# Patient Record
Sex: MALE | Race: WHITE | NOT HISPANIC OR LATINO | Employment: FULL TIME | URBAN - METROPOLITAN AREA
[De-identification: names, ages, dates, MRNs, and addresses within clinical notes are randomized per-mention and may not be internally consistent; named-entity substitution may affect disease eponyms.]

---

## 2017-08-22 ENCOUNTER — OFFICE VISIT (OUTPATIENT)
Dept: CARDIOLOGY | Facility: MEDICAL CENTER | Age: 62
End: 2017-08-22

## 2017-08-22 VITALS
SYSTOLIC BLOOD PRESSURE: 138 MMHG | HEIGHT: 69 IN | HEART RATE: 87 BPM | WEIGHT: 199 LBS | OXYGEN SATURATION: 96 % | DIASTOLIC BLOOD PRESSURE: 82 MMHG | BODY MASS INDEX: 29.47 KG/M2

## 2017-08-22 DIAGNOSIS — I42.8 NON-ISCHEMIC CARDIOMYOPATHY (HCC): ICD-10-CM

## 2017-08-22 DIAGNOSIS — I50.20 ACC/AHA STAGE C SYSTOLIC HEART FAILURE (HCC): ICD-10-CM

## 2017-08-22 DIAGNOSIS — I50.9 HEART FAILURE, NYHA CLASS 1 (HCC): ICD-10-CM

## 2017-08-22 DIAGNOSIS — I10 ESSENTIAL HYPERTENSION: ICD-10-CM

## 2017-08-22 PROCEDURE — 94620 PR PULMONARY STRESS TESTING,SIMPLE: CPT | Performed by: INTERNAL MEDICINE

## 2017-08-22 PROCEDURE — 99214 OFFICE O/P EST MOD 30 MIN: CPT | Performed by: INTERNAL MEDICINE

## 2017-08-22 RX ORDER — SPIRONOLACTONE 25 MG/1
25 TABLET ORAL DAILY
Qty: 30 TAB | Refills: 3 | Status: SHIPPED | OUTPATIENT
Start: 2017-08-22 | End: 2017-12-03 | Stop reason: SDUPTHER

## 2017-08-22 RX ORDER — CARVEDILOL 25 MG/1
25 TABLET ORAL 2 TIMES DAILY WITH MEALS
Qty: 180 TAB | Refills: 3 | Status: SHIPPED | OUTPATIENT
Start: 2017-08-22 | End: 2018-02-06 | Stop reason: SDUPTHER

## 2017-08-22 RX ORDER — FUROSEMIDE 20 MG/1
20 TABLET ORAL 2 TIMES DAILY
Qty: 180 TAB | Refills: 3 | Status: SHIPPED | OUTPATIENT
Start: 2017-08-22 | End: 2018-02-06 | Stop reason: SDUPTHER

## 2017-08-22 RX ORDER — LISINOPRIL 40 MG/1
40 TABLET ORAL DAILY
Qty: 90 TAB | Refills: 3 | Status: SHIPPED | OUTPATIENT
Start: 2017-08-22 | End: 2018-02-06 | Stop reason: SDUPTHER

## 2017-08-22 RX ORDER — POTASSIUM CHLORIDE 750 MG/1
10 CAPSULE, EXTENDED RELEASE ORAL 2 TIMES DAILY
Qty: 60 CAP | Refills: 11 | Status: SHIPPED | OUTPATIENT
Start: 2017-08-22 | End: 2018-02-06

## 2017-08-22 ASSESSMENT — MINNESOTA LIVING WITH HEART FAILURE QUESTIONNAIRE (MLHF)
FEELING LIKE A BURDEN TO FAMILY AND FRIENDS: 1
WORKING AROUND THE HOUSE OR YARD DIFFICULT: 0
LOSS OF SELF CONTROL IN YOUR LIFE: 1
TIRED, FATIGUED OR LOW ON ENERGY: 1
DIFFICULTY TO CONCENTRATE OR REMEMBERING THINGS: 1
MAKING YOU SHORT OF BREATH: 1
DIFFICULTY GOING AWAY FROM HOME: 1
TOTAL_SCORE: 15
WALKING ABOUT OR CLIMBING STAIRS DIFFICULT: 1
DIFFICULTY WITH SEXUAL ACTIVITIES: 0
DIFFICULTY WORKING TO EARN A LIVING: 1
SWELLING IN ANKLES OR LEGS: 0
DIFFICULTY SOCIALIZING WITH FAMILY OR FRIENDS: 0
COSTING YOU MONEY FOR MEDICAL CARE: 0
MAKING YOU STAY IN A HOSPITAL: 0
MAKING YOU FEEL DEPRESSED: 2
MAKING YOU WORRY: 2
HAVING TO SIT OR LIE DOWN DURING THE DAY: 1
GIVING YOU SIDE EFFECTS FROM TREATMENTS: 0
DIFFICULTY SLEEPING WELL AT NIGHT: 0
DIFFICULTY WITH RECREATIONAL PASTIMES, SPORTS, HOBBIES: 1
EATING LESS FOODS YOU LIKE: 1

## 2017-08-22 ASSESSMENT — ENCOUNTER SYMPTOMS
SPEECH CHANGE: 0
CLAUDICATION: 0
VOMITING: 0
BLURRED VISION: 0
BRUISES/BLEEDS EASILY: 0
SENSORY CHANGE: 0
DEPRESSION: 0
EYE PAIN: 0
PALPITATIONS: 0
MYALGIAS: 0
FALLS: 0
ABDOMINAL PAIN: 0
DIZZINESS: 0
EYE DISCHARGE: 0
COUGH: 0
WEIGHT LOSS: 0
LOSS OF CONSCIOUSNESS: 0
PND: 0
NAUSEA: 0
SHORTNESS OF BREATH: 0
FEVER: 0
BLOOD IN STOOL: 0
HEADACHES: 0
ORTHOPNEA: 0
CHILLS: 0
DOUBLE VISION: 0
HALLUCINATIONS: 0

## 2017-08-22 ASSESSMENT — 6 MINUTE WALK TEST (6MWT): TOTAL DISTANCE WALKED (METERS): 414.5

## 2017-08-22 ASSESSMENT — NEW YORK HEART ASSOCIATION (NYHA) CLASSIFICATION: NYHA FUNCTIONAL CLASS: CLASS L

## 2017-08-22 NOTE — MR AVS SNAPSHOT
"        Doug Summersho   2017 4:30 PM   Office Visit   MRN: [id number]    Department:  [facility]   Dept Phone:  [phone]    Description:  Male : 1955   Provider:  Neil King M.D.           Reason for Visit     Follow-Up needs medication refill      Allergies as of 2017     No Known Allergies      You were diagnosed with     ACC/AHA stage C systolic heart failure (CMS-HCC)   [9182503]       Heart failure, NYHA class 1 (CMS-HCC)   [339145]       Non-ischemic cardiomyopathy (CMS-HCC)   [039895]       Essential hypertension   [8502509]       Alcoholic cardiomyopathy (CMS-HCC)   [425.5.ICD-9-CM]         Vital Signs     Blood Pressure Pulse Height Weight Body Mass Index Oxygen Saturation    138/82 mmHg 87 1.753 m (5' 9\") 90.266 kg (199 lb) 29.37 kg/m2 96%    Smoking Status                   Current Every Day Smoker           Basic Information     Date Of Birth Sex Race Ethnicity Preferred Language    1955 Male White Non- English      Your appointments     2017  4:30 PM   Heart Failure Established with Neil King M.D.   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      Below and/or attached are the medications your provider expects you to take. Review all of your home medications and newly ordered medications with your provider and/or pharmacist. Follow medication instructions as directed by your provider and/or pharmacist. Please keep your medication list with you and share with your provider. Update the information when medications are discontinued, doses are changed, or new medications (including over-the-counter products) are added; and carry medication information at all times in the event of emergency situations     Allergies:  No Known Allergies          Medications  Valid as of: August 22, 2017 -  5:21 PM    Generic Name Brand Name Tablet Size Instructions for use    Aspirin (Tablet Delayed Response) aspirin 81 MG Take 1 Tab by mouth every day.        Carvedilol (Tab) COREG 25 MG Take 1 Tab by mouth 2 times a day, with meals.        Furosemide (Tab) LASIX 20 MG Take 1 Tab by mouth 2 times a day.        Lisinopril (Tab) PRINIVIL, ZESTRIL 40 MG Take 1 Tab by mouth every day.        Multiple Vitamin (Tab) THERAGRAN  Take 1 Tab by mouth every day.        Potassium Chloride (Cap CR) MICRO-K 10 MEQ Take 1 Cap by mouth 2 times a day.        Spironolactone (Tab) ALDACTONE 25 MG Take 1 Tab by mouth every day.        .                 Medicines prescribed today were sent to:     [id number] - [place], [place] - [address]    [address] [place] [address]    Phone: [phone] Fax: [phone]    Open 24 Hours?: No      Medication refill instructions:       If your prescription bottle indicates you have medication refills left, it is not necessary to call your provider’s office. Please contact your pharmacy and they will refill your medication.    If your prescription bottle indicates you do not have any refills left, you may request refills at any time through one of the following ways: The online  Possibility Space system (except Urgent Care), by calling your provider’s office, or by asking your pharmacy to contact your provider’s office with a refill request. Medication refills are processed only during regular business hours and may not be available until the next business day. Your provider may request additional information or to have a follow-up visit with you prior to refilling your medication.   *Please Note: Medication refills are assigned a new Rx number when refilled electronically. Your pharmacy may indicate that no refills were authorized even though a new prescription for the same medication is available at the pharmacy. Please request the medicine by name with the pharmacy before contacting your provider for a refill.        Your To Do List     Future Labs/Procedures Complete By Expires    BASIC METABOLIC PANEL  As directed 8/22/2018    COMP METABOLIC PANEL  As directed 8/23/2018    ECHOCARDIOGRAM LTD W/O CONT  As directed 8/22/2018         Possibility Space Access Code: [email or web address]  Expires: 9/21/2017  5:21 PM    Possibility Space  A secure, online tool to manage your health information     [employer]’s Possibility Space® is a secure, online tool that connects you to your personalized health information from the privacy of your home -- day or night - making it very easy for you to manage your healthcare. Once the activation process is completed, you can even access your medical information using the Possibility Space chavo, which is available for free in the Apple Chavo store or Google Play store.     Possibility Space provides the following levels of access (as shown below):   My Chart Features   [facility] Doctor [facility]  Specialists [facility]  Urgent  Care Non-[facility]  Primary Care  Doctor   Email your healthcare team securely and privately 24/7 X X X    Manage appointments: schedule your next appointment; view details of past/upcoming appointments X      Request prescription refills. X      View recent personal medical records, including  lab and immunizations X X X X   View health record, including health history, allergies, medications X X X X   Read reports about your outpatient visits, procedures, consult and ER notes X X X X   See your discharge summary, which is a recap of your hospital and/or ER visit that includes your diagnosis, lab results, and care plan. X X       How to register for [employer]:  1. Go to  [email or web address].  2. Click on the Sign Up Now box, which takes you to the New Member Sign Up page. You will need to provide the following information:  a. Enter your [employer] Access Code exactly as it appears at the top of this page. (You will not need to use this code after you’ve completed the sign-up process. If you do not sign up before the expiration date, you must request a new code.)   b. Enter your date of birth.   c. Enter your home email address.   d. Click Submit, and follow the next screen’s instructions.  3. Create a [employer] ID. This will be your [employer] login ID and cannot be changed, so think of one that is secure and easy to remember.  4. Create a [employer] password. You can change your password at any time.  5. Enter your Password Reset Question and Answer. This can be used at a later time if you forget your password.   6. Enter your e-mail address. This allows you to receive e-mail notifications when new information is available in [employer].  7. Click Sign Up. You can now view your health information.    For assistance activating your [employer] account, call [phone]        Quit Tobacco Information     Do you want to quit using tobacco?    Quitting tobacco decreases risks of cancer, heart and lung disease, increases life expectancy, improves sense of taste and smell, and increases spending money, among other benefits.    If you are thinking about quitting, we can help.  • [employer] Quit Tobacco Program: [phone]  o Program occurs weekly for four weeks and includes pharmacist consultation on products to support  quitting smoking or chewing tobacco. A provider referral is needed for pharmacist consultation.  • Tobacco Users Help Hotline: 1-760-QUIT-NOW (995-4348) or https://[place].quitlogix.org/  o Free, confidential telephone and online coaching for [place] residents. Sessions are designed on a schedule that is convenient for you. Eligible clients receive free nicotine replacement therapy.  • Nationally: www.smokefree.gov  o Information and professional assistance to support both immediate and long-term needs as you become, and remain, a non-smoker. Smokefree.gov allows you to choose the help that best fits your needs.

## 2017-08-23 NOTE — PROGRESS NOTES
Subjective:   Doug Arora is a 62 y.o. male who presents today for cardiac care for nonischemic cardiomyopathy with left ventricular systolic function documented at 20%. He did have a history of alcohol abuse along with methamphetamine abuse. He was in the hospital in August 2016 because of heart failure exacerbation. Since then, patient has stopped using methamphetamine and alcohol. He feels extremely well these days. He is currently taken carvedilol 25 mg by mouth twice a day, lisinopril 40 by mouth once a day and furosemide 20 mg by mouth bid.    Patient was able to complete 415 m during his 6 minute walk test. his O2 saturation at baseline was 96% and at the end of the test, the O2 saturation was 95%. he reported 0.5 level of dyspnea on Jan scale.        Past Medical History   Diagnosis Date   • HTN (hypertension) 9/7/2010   • Hep C w/o coma, chronic (CMS-HCC)      Past Surgical History   Procedure Laterality Date   • Tonsillectomy     • Hernia repair       Family History   Problem Relation Age of Onset   • Stroke Mother    • Psychiatry Father    • Hypertension Father      History   Smoking status   • Current Every Day Smoker -- 1.50 packs/day for 24 years   • Types: Cigarettes   Smokeless tobacco   • Never Used     No Known Allergies  Outpatient Encounter Prescriptions as of 8/22/2017   Medication Sig Dispense Refill   • spironolactone (ALDACTONE) 25 MG Tab Take 1 Tab by mouth every day. 30 Tab 3   • lisinopril (PRINIVIL, ZESTRIL) 40 MG tablet Take 1 Tab by mouth every day. 90 Tab 3   • potassium chloride (MICRO-K) 10 MEQ capsule Take 1 Cap by mouth 2 times a day. 60 Cap 11   • furosemide (LASIX) 20 MG Tab Take 1 Tab by mouth 2 times a day. 180 Tab 3   • carvedilol (COREG) 25 MG Tab Take 1 Tab by mouth 2 times a day, with meals. 180 Tab 3   • aspirin EC 81 MG EC tablet Take 1 Tab by mouth every day. 30 Tab 0   • [DISCONTINUED] potassium chloride (MICRO-K) 10 MEQ capsule Take 10 mEq by mouth 2 times a day.    "  • [DISCONTINUED] lisinopril (PRINIVIL, ZESTRIL) 40 MG tablet Take 1 Tab by mouth every day. 30 Tab 11   • [DISCONTINUED] carvedilol (COREG) 25 MG Tab Take 1 Tab by mouth 2 times a day, with meals. 60 Tab 11   • [DISCONTINUED] furosemide (LASIX) 20 MG Tab Take 1 Tab by mouth 2 times a day. 60 Tab 11   • multivitamin (THERAGRAN) Tab Take 1 Tab by mouth every day.       No facility-administered encounter medications on file as of 8/22/2017.     Review of Systems   Constitutional: Negative for fever, chills, weight loss and malaise/fatigue.   HENT: Negative for ear discharge, ear pain, hearing loss and nosebleeds.    Eyes: Negative for blurred vision, double vision, pain and discharge.   Respiratory: Negative for cough and shortness of breath.    Cardiovascular: Negative for chest pain, palpitations, orthopnea, claudication, leg swelling and PND.   Gastrointestinal: Negative for nausea, vomiting, abdominal pain, blood in stool and melena.   Genitourinary: Negative for dysuria and hematuria.   Musculoskeletal: Negative for myalgias, joint pain and falls.   Skin: Negative for itching and rash.   Neurological: Negative for dizziness, sensory change, speech change, loss of consciousness and headaches.   Endo/Heme/Allergies: Negative for environmental allergies. Does not bruise/bleed easily.   Psychiatric/Behavioral: Negative for depression, suicidal ideas and hallucinations.        Objective:   /82 mmHg  Pulse 87  Ht 1.753 m (5' 9\")  Wt 90.266 kg (199 lb)  BMI 29.37 kg/m2  SpO2 96%    Physical Exam   Constitutional: He is oriented to person, place, and time. He appears well-developed and well-nourished.   HENT:   Head: Normocephalic and atraumatic.   Eyes: EOM are normal.   Neck: Normal range of motion. No JVD present.   Cardiovascular: Normal rate, regular rhythm, normal heart sounds and intact distal pulses.  Exam reveals no gallop and no friction rub.    No murmur heard.  Bilateral femoral pulses are 2+, " bilateral dorsalis pedis pulses are 2+, bilateral posterior tibialis pulses are 2+.   Pulmonary/Chest: No respiratory distress. He has no wheezes. He has no rales. He exhibits no tenderness.   Abdominal: Soft. Bowel sounds are normal. There is no tenderness. There is no rebound and no guarding.   The is no presence of abdominal bruits   Musculoskeletal: Normal range of motion.   Neurological: He is alert and oriented to person, place, and time.   Skin: Skin is warm and dry.   Psychiatric: He has a normal mood and affect.   Nursing note and vitals reviewed.      Assessment:     1. ACC/AHA stage C systolic heart failure (CMS-[facility])  spironolactone (ALDACTONE) 25 MG Tab    BASIC METABOLIC PANEL    COMP METABOLIC PANEL    LIPID PANEL    ECHOCARDIOGRAM LTD W/O CONT    potassium chloride (MICRO-K) 10 MEQ capsule   2. Heart failure, NYHA class 1 (CMS-[facility])  spironolactone (ALDACTONE) 25 MG Tab    BASIC METABOLIC PANEL    COMP METABOLIC PANEL    LIPID PANEL    ECHOCARDIOGRAM LTD W/O CONT    potassium chloride (MICRO-K) 10 MEQ capsule   3. Non-ischemic cardiomyopathy (CMS-[facility])  spironolactone (ALDACTONE) 25 MG Tab    BASIC METABOLIC PANEL    COMP METABOLIC PANEL    LIPID PANEL    ECHOCARDIOGRAM LTD W/O CONT    potassium chloride (MICRO-K) 10 MEQ capsule   4. Essential hypertension  spironolactone (ALDACTONE) 25 MG Tab    BASIC METABOLIC PANEL    COMP METABOLIC PANEL    LIPID PANEL    ECHOCARDIOGRAM LTD W/O CONT    lisinopril (PRINIVIL, ZESTRIL) 40 MG tablet    potassium chloride (MICRO-K) 10 MEQ capsule    furosemide (LASIX) 20 MG Tab   5. Alcoholic cardiomyopathy (CMS-HCC)  lisinopril (PRINIVIL, ZESTRIL) 40 MG tablet    potassium chloride (MICRO-K) 10 MEQ capsule    furosemide (LASIX) 20 MG Tab    carvedilol (COREG) 25 MG Tab       Medical Decision Making:  Today's Assessment / Status / Plan:     Today, based on physical examination findings, patient is euvolemic. No JVD, lungs are clear to auscultation, no pitting edema in  bilateral lower extremities, no ascites.    We will add spironolactone 25 mg by mouth once a day.  Continue carvedilol at 25 mg by mouth twice a day, lisinopril 40 mg by mouth once a day and furosemide 20 mg by mouth twice a day.    We will get blood tests in 2 weeks.    Patient does not have insurance and therefore cannot afford repeated transthoracic echocardiogram right now. I think that is okay for us to wait until he can get insurance.    No indication for ICD as patient is NYHA class I.

## 2017-08-25 NOTE — PROGRESS NOTES
"Reevaluation of 6MWT/MLWHF Questionnaire    Date: 2016  6MWT: refused-too SOB from walking from car.  MLWHF: 61    Date: 2017  6MWT: 414.5 meters  MLWHF: 15    OP Heart Failure  Vitals  Appointment Type: Heart Failure Established  Weight: 90.266 kg (199 lb)  Height: 175.3 cm (5' 9\")  BMI (Calculated): 29.39  Blood Pressure: 138/82 mmHg  Pulse: 87    System Assessment  NYHA Functional Class Assessment: Class l  ACC/AHA HF Stage: C    Smoking Hx  Have you Ever Smoked: Yes  Have you Smoked in the Last 12 Mos: Yes  Have you Quit Smoking: No   Smoking Cessation Offered: Patient Counseled    Alcohol Hx  Do you Drink?: No     Illicit Drug Hx  Illicit Drug History: Yes    [place] Living with Heart Failure  Swelling in Ankles or Legs: 0  Having to Sit or Lie Down During the Day: 1  Walking About or Climbing Stairs Difficult: 1  Working Around the House or Yard Difficult: 0  Difficulty Going Away from Home: 1  Difficulty Sleeping Well at Night: 0  Difficulty Socializing with Family or Friends: 0  Difficulty Working to Earn a Livin  Difficulty with Recreational Pastimes, Sports, Hobbies: 1  Difficulty with Sexual Activities: 0  Eating Less Foods You Like: 1  Making you Short of Breath: 1  Tired, Fatigued or Low on Energy: 1  Making you Stay in a Hospital: 0  Costing you Money for Medical Care?: 0  Giving you Side Effects from Treatments: 0  Feeling like a [place] to Family and Friends: 1  Loss of Self Control in your Life: 1  Making You Worry: 2  Difficulty to Concentrate or Remembering Things: 1  Making you Feel Depressed: 2  MLF Total Score : 15    6 Minute Walk Test  Baseline to end of test: 6:00  Total meters walked: 414.5       JEREMI Charles RN  [phone]  "

## 2017-09-28 ENCOUNTER — TELEPHONE (OUTPATIENT)
Dept: CARDIOLOGY | Facility: MEDICAL CENTER | Age: 62
End: 2017-09-28

## 2017-09-28 NOTE — TELEPHONE ENCOUNTER
Pt calling to clarify directions and dosage on medication   Received: Today   Message Contents   Saeed Correa R.N.   Phone Number: [phone]             TT/Fifi     Pt is calling to clarify dosage/direction's for potassium chloride (MICRO-K) 10 MEQ. Per pt he only remembers having to take medication once daily. He can be reached at [phone].      Returned patient call. Potassium dose clarified with patient. Pt discussed occ intermittent lightheadedness. Discussed hydration, after patient states being in the sun. Pt states he doesn't come close to 2L intake per day. Pt encouraged to do so. Pt states he is opening another bottle of water right now and will keep a more careful eye on his intake and will call back if no changes.

## 2017-10-13 DIAGNOSIS — E78.49 OTHER HYPERLIPIDEMIA: ICD-10-CM

## 2017-10-13 RX ORDER — ATORVASTATIN CALCIUM 20 MG/1
20 TABLET, FILM COATED ORAL DAILY
Qty: 90 TAB | Refills: 3 | OUTPATIENT
Start: 2017-10-13 | End: 2018-02-06 | Stop reason: SDUPTHER

## 2017-12-03 DIAGNOSIS — I50.20 ACC/AHA STAGE C SYSTOLIC HEART FAILURE (HCC): ICD-10-CM

## 2017-12-03 DIAGNOSIS — I50.9 HEART FAILURE, NYHA CLASS 1 (HCC): ICD-10-CM

## 2017-12-03 DIAGNOSIS — I42.8 NON-ISCHEMIC CARDIOMYOPATHY (HCC): ICD-10-CM

## 2017-12-03 DIAGNOSIS — I10 ESSENTIAL HYPERTENSION: ICD-10-CM

## 2017-12-05 RX ORDER — SPIRONOLACTONE 25 MG/1
TABLET ORAL
Qty: 90 TAB | Refills: 3 | Status: SHIPPED | OUTPATIENT
Start: 2017-12-05 | End: 2018-02-06 | Stop reason: SDUPTHER

## 2018-02-02 ENCOUNTER — TELEPHONE (OUTPATIENT)
Dept: CARDIOLOGY | Facility: MEDICAL CENTER | Age: 63
End: 2018-02-02

## 2018-02-06 ENCOUNTER — OFFICE VISIT (OUTPATIENT)
Dept: CARDIOLOGY | Facility: MEDICAL CENTER | Age: 63
End: 2018-02-06

## 2018-02-06 VITALS
BODY MASS INDEX: 29.92 KG/M2 | WEIGHT: 202 LBS | DIASTOLIC BLOOD PRESSURE: 62 MMHG | SYSTOLIC BLOOD PRESSURE: 114 MMHG | HEART RATE: 66 BPM | HEIGHT: 69 IN | OXYGEN SATURATION: 97 %

## 2018-02-06 DIAGNOSIS — E78.49 OTHER HYPERLIPIDEMIA: ICD-10-CM

## 2018-02-06 DIAGNOSIS — I42.8 NON-ISCHEMIC CARDIOMYOPATHY (HCC): ICD-10-CM

## 2018-02-06 DIAGNOSIS — I50.9 HEART FAILURE, NYHA CLASS 1 (HCC): ICD-10-CM

## 2018-02-06 DIAGNOSIS — I50.20 ACC/AHA STAGE C SYSTOLIC HEART FAILURE (HCC): ICD-10-CM

## 2018-02-06 DIAGNOSIS — I10 ESSENTIAL HYPERTENSION: ICD-10-CM

## 2018-02-06 PROCEDURE — 99214 OFFICE O/P EST MOD 30 MIN: CPT | Performed by: INTERNAL MEDICINE

## 2018-02-06 RX ORDER — CARVEDILOL 25 MG/1
25 TABLET ORAL 2 TIMES DAILY WITH MEALS
Qty: 180 TAB | Refills: 3 | Status: SHIPPED | OUTPATIENT
Start: 2018-02-06 | End: 2019-01-25 | Stop reason: SDUPTHER

## 2018-02-06 RX ORDER — ATORVASTATIN CALCIUM 20 MG/1
20 TABLET, FILM COATED ORAL DAILY
Qty: 90 TAB | Refills: 3 | Status: SHIPPED | OUTPATIENT
Start: 2018-02-06 | End: 2019-01-25 | Stop reason: SDUPTHER

## 2018-02-06 RX ORDER — FUROSEMIDE 20 MG/1
20 TABLET ORAL 2 TIMES DAILY
Qty: 180 TAB | Refills: 3 | Status: SHIPPED | OUTPATIENT
Start: 2018-02-06 | End: 2019-01-25 | Stop reason: SDUPTHER

## 2018-02-06 RX ORDER — SPIRONOLACTONE 25 MG/1
TABLET ORAL
Qty: 90 TAB | Refills: 3 | Status: SHIPPED | OUTPATIENT
Start: 2018-02-06 | End: 2019-01-25 | Stop reason: SDUPTHER

## 2018-02-06 RX ORDER — LISINOPRIL 40 MG/1
40 TABLET ORAL DAILY
Qty: 90 TAB | Refills: 3 | Status: SHIPPED | OUTPATIENT
Start: 2018-02-06 | End: 2019-01-25 | Stop reason: SDUPTHER

## 2018-02-06 ASSESSMENT — ENCOUNTER SYMPTOMS
CLAUDICATION: 0
WEIGHT LOSS: 0
SHORTNESS OF BREATH: 0
HALLUCINATIONS: 0
SPEECH CHANGE: 0
EYE DISCHARGE: 0
DIZZINESS: 0
HEADACHES: 0
PND: 0
FALLS: 0
PALPITATIONS: 0
CHILLS: 0
SENSORY CHANGE: 0
EYE PAIN: 0
BLOOD IN STOOL: 0
COUGH: 0
ABDOMINAL PAIN: 0
ORTHOPNEA: 0
FEVER: 0
BRUISES/BLEEDS EASILY: 0
LOSS OF CONSCIOUSNESS: 0
VOMITING: 0
NAUSEA: 0
DOUBLE VISION: 0
BLURRED VISION: 0
DEPRESSION: 0
MYALGIAS: 0

## 2018-02-07 NOTE — PROGRESS NOTES
Subjective:   Doug Arora is a 62 y.o. male who presents today for cardiac care for nonischemic cardiomyopathy with left ventricular systolic function documented at 20%. He did have a history of alcohol abuse along with methamphetamine abuse. He was in the hospital in August 2016 because of heart failure exacerbation. Since then, patient has stopped using methamphetamine and alcohol. He feels extremely well these days. He is currently taken carvedilol 25 mg by mouth twice a day, lisinopril 40 by mouth once a day and furosemide 20 mg by mouth bid.     At prior visit, patient was able to complete 415 m during his 6 minute walk test. his O2 saturation at baseline was 96% and at the end of the test, the O2 saturation was 95%. he reported 0.5 level of dyspnea on Jan scale.    Chief Complaint: dyspnea.    Past Medical History:   Diagnosis Date   • Hep C w/o coma, chronic (CMS-HCC)    • HTN (hypertension) 9/7/2010     Past Surgical History:   Procedure Laterality Date   • HERNIA REPAIR     • TONSILLECTOMY       Family History   Problem Relation Age of Onset   • Stroke Mother    • Psychiatry Father    • Hypertension Father      History   Smoking Status   • Current Every Day Smoker   • Packs/day: 1.50   • Years: 24.00   • Types: Cigarettes   Smokeless Tobacco   • Never Used     No Known Allergies  Outpatient Encounter Prescriptions as of 2/6/2018   Medication Sig Dispense Refill   • carvedilol (COREG) 25 MG Tab Take 1 Tab by mouth 2 times a day, with meals. 180 Tab 3   • furosemide (LASIX) 20 MG Tab Take 1 Tab by mouth 2 times a day. 180 Tab 3   • atorvastatin (LIPITOR) 20 MG Tab Take 1 Tab by mouth every day. 90 Tab 3   • lisinopril (PRINIVIL, ZESTRIL) 40 MG tablet Take 1 Tab by mouth every day. 90 Tab 3   • spironolactone (ALDACTONE) 25 MG Tab TAKE ONE TABLET BY MOUTH ONE TIME DAILY 90 Tab 3   • multivitamin (THERAGRAN) Tab Take 1 Tab by mouth every day.     • aspirin EC 81 MG EC tablet Take 1 Tab by mouth every day. 30  "Tab 0   • [DISCONTINUED] spironolactone (ALDACTONE) 25 MG Tab TAKE ONE TABLET BY MOUTH ONE TIME DAILY 90 Tab 3   • [DISCONTINUED] atorvastatin (LIPITOR) 20 MG Tab Take 1 Tab by mouth every day. 90 Tab 3   • [DISCONTINUED] lisinopril (PRINIVIL, ZESTRIL) 40 MG tablet Take 1 Tab by mouth every day. 90 Tab 3   • [DISCONTINUED] potassium chloride (MICRO-K) 10 MEQ capsule Take 1 Cap by mouth 2 times a day. 60 Cap 11   • [DISCONTINUED] furosemide (LASIX) 20 MG Tab Take 1 Tab by mouth 2 times a day. 180 Tab 3   • [DISCONTINUED] carvedilol (COREG) 25 MG Tab Take 1 Tab by mouth 2 times a day, with meals. 180 Tab 3     No facility-administered encounter medications on file as of 2/6/2018.      Review of Systems   Constitutional: Negative for chills, fever, malaise/fatigue and weight loss.   HENT: Negative for ear discharge, ear pain, hearing loss and nosebleeds.    Eyes: Negative for blurred vision, double vision, pain and discharge.   Respiratory: Negative for cough and shortness of breath.    Cardiovascular: Negative for chest pain, palpitations, orthopnea, claudication, leg swelling and PND.   Gastrointestinal: Negative for abdominal pain, blood in stool, melena, nausea and vomiting.   Genitourinary: Negative for dysuria and hematuria.   Musculoskeletal: Negative for falls, joint pain and myalgias.   Skin: Negative for itching and rash.   Neurological: Negative for dizziness, sensory change, speech change, loss of consciousness and headaches.   Endo/Heme/Allergies: Negative for environmental allergies. Does not bruise/bleed easily.   Psychiatric/Behavioral: Negative for depression, hallucinations and suicidal ideas.        Objective:   /62   Pulse 66   Ht 1.753 m (5' 9\")   Wt 91.6 kg (202 lb)   SpO2 97%   BMI 29.83 kg/m²     Physical Exam   Constitutional: He is oriented to person, place, and time. No distress.   HENT:   Head: Normocephalic and atraumatic.   Eyes: EOM are normal.   Neck: Normal range of motion. " No JVD present.   Cardiovascular: Normal rate, regular rhythm, normal heart sounds and intact distal pulses.  Exam reveals no gallop and no friction rub.    No murmur heard.  Bilateral femoral pulses are 2+, bilateral dorsalis pedis pulses are 2+, bilateral posterior tibialis pulses are 2+.   Pulmonary/Chest: No respiratory distress. He has no wheezes. He has no rales. He exhibits no tenderness.   Abdominal: Soft. Bowel sounds are normal. There is no tenderness. There is no rebound and no guarding.   The is no presence of abdominal bruits   Musculoskeletal: Normal range of motion.   Neurological: He is alert and oriented to person, place, and time.   Skin: Skin is warm and dry.   Psychiatric: He has a normal mood and affect.   Nursing note and vitals reviewed.      Assessment:     1. ACC/AHA stage C systolic heart failure (CMS-HCC)  carvedilol (COREG) 25 MG Tab    spironolactone (ALDACTONE) 25 MG Tab    BASIC METABOLIC PANEL   2. Heart failure, NYHA class 1 (CMS-[facility])  carvedilol (COREG) 25 MG Tab    spironolactone (ALDACTONE) 25 MG Tab    BASIC METABOLIC PANEL   3. Non-ischemic cardiomyopathy (CMS-HCC)  spironolactone (ALDACTONE) 25 MG Tab   4. Essential hypertension  furosemide (LASIX) 20 MG Tab    lisinopril (PRINIVIL, ZESTRIL) 40 MG tablet    spironolactone (ALDACTONE) 25 MG Tab    BASIC METABOLIC PANEL   5. Other hyperlipidemia  atorvastatin (LIPITOR) 20 MG Tab       Medical Decision Making:  Today's Assessment / Status / Plan:   Today, based on physical examination findings, patient is euvolemic. No JVD, lungs are clear to auscultation, no pitting edema in bilateral lower extremities, no ascites.    Dry weight is 202 lbs.    Continue spironolactone 25 mg by mouth once a day.  Continue carvedilol at 25 mg by mouth twice a day, lisinopril 40 mg by mouth once a day and furosemide 20 mg by mouth twice a day.     Patient does not have insurance and therefore cannot afford repeated transthoracic echocardiogram right  now. I think that is okay for us to wait until he can get insurance.     No indication for ICD as patient is NYHA class I.    I will see patient back in clinic with lab tests and studies results in 6 months.

## 2018-12-17 ENCOUNTER — TELEPHONE (OUTPATIENT)
Dept: CARDIOLOGY | Facility: MEDICAL CENTER | Age: 63
End: 2018-12-17

## 2018-12-17 NOTE — TELEPHONE ENCOUNTER
"S/w patient to see if the he had completed his labs but the patient stated that he needs to re-schedule due to that he now has medical insurance and it takes effect in Jan of next year. I asked the patient if he needs to be transferred to scheduling and the patient stated \"Yes, please.\"     I transferred patient to ext [phone] to re-schedule appt.   "

## 2019-01-25 ENCOUNTER — OFFICE VISIT (OUTPATIENT)
Dept: CARDIOLOGY | Facility: MEDICAL CENTER | Age: 64
End: 2019-01-25
Payer: COMMERCIAL

## 2019-01-25 VITALS
OXYGEN SATURATION: 95 % | HEIGHT: 69 IN | WEIGHT: 195 LBS | SYSTOLIC BLOOD PRESSURE: 92 MMHG | DIASTOLIC BLOOD PRESSURE: 50 MMHG | BODY MASS INDEX: 28.88 KG/M2 | HEART RATE: 82 BPM

## 2019-01-25 DIAGNOSIS — I51.89 LEFT VENTRICULAR SYSTOLIC DYSFUNCTION, NYHA CLASS 2: ICD-10-CM

## 2019-01-25 DIAGNOSIS — Z79.899 HIGH RISK MEDICATION USE: ICD-10-CM

## 2019-01-25 DIAGNOSIS — I10 HTN (HYPERTENSION), MALIGNANT: ICD-10-CM

## 2019-01-25 DIAGNOSIS — I42.8 NON-ISCHEMIC CARDIOMYOPATHY (HCC): ICD-10-CM

## 2019-01-25 DIAGNOSIS — I50.20 ACC/AHA STAGE C SYSTOLIC HEART FAILURE (HCC): ICD-10-CM

## 2019-01-25 DIAGNOSIS — E78.2 MIXED HYPERLIPIDEMIA: ICD-10-CM

## 2019-01-25 PROCEDURE — 99215 OFFICE O/P EST HI 40 MIN: CPT | Mod: 25 | Performed by: INTERNAL MEDICINE

## 2019-01-25 PROCEDURE — 94618 PULMONARY STRESS TESTING: CPT | Performed by: INTERNAL MEDICINE

## 2019-01-25 RX ORDER — CARVEDILOL 25 MG/1
25 TABLET ORAL 2 TIMES DAILY WITH MEALS
Qty: 180 TAB | Refills: 3 | Status: SHIPPED | OUTPATIENT
Start: 2019-01-25 | End: 2019-05-28 | Stop reason: SDUPTHER

## 2019-01-25 RX ORDER — ATORVASTATIN CALCIUM 20 MG/1
20 TABLET, FILM COATED ORAL DAILY
Qty: 90 TAB | Refills: 3 | Status: SHIPPED | OUTPATIENT
Start: 2019-01-25 | End: 2019-05-28 | Stop reason: SDUPTHER

## 2019-01-25 RX ORDER — SPIRONOLACTONE 25 MG/1
TABLET ORAL
Qty: 90 TAB | Refills: 3 | Status: SHIPPED | OUTPATIENT
Start: 2019-01-25 | End: 2019-05-28 | Stop reason: SDUPTHER

## 2019-01-25 RX ORDER — LISINOPRIL 40 MG/1
40 TABLET ORAL DAILY
Qty: 90 TAB | Refills: 3 | Status: SHIPPED | OUTPATIENT
Start: 2019-01-25 | End: 2019-05-28 | Stop reason: SDUPTHER

## 2019-01-25 RX ORDER — FUROSEMIDE 20 MG/1
20 TABLET ORAL 2 TIMES DAILY
Qty: 180 TAB | Refills: 3 | Status: SHIPPED | OUTPATIENT
Start: 2019-01-25 | End: 2019-05-28 | Stop reason: SDUPTHER

## 2019-01-25 ASSESSMENT — ENCOUNTER SYMPTOMS
NAUSEA: 0
SPEECH CHANGE: 0
COUGH: 0
PALPITATIONS: 0
ABDOMINAL PAIN: 0
MYALGIAS: 0
HEADACHES: 0
SHORTNESS OF BREATH: 1
FALLS: 0
VOMITING: 0
FEVER: 0
DEPRESSION: 0
DIAPHORESIS: 0
LOSS OF CONSCIOUSNESS: 0
DIZZINESS: 0
BRUISES/BLEEDS EASILY: 0
EYE PAIN: 0
HALLUCINATIONS: 0
MEMORY LOSS: 0
CLAUDICATION: 0
BLURRED VISION: 0
PND: 0
SENSORY CHANGE: 0
BLOOD IN STOOL: 0
CHILLS: 0
ORTHOPNEA: 0
DOUBLE VISION: 0
WEIGHT LOSS: 0
EYE DISCHARGE: 0

## 2019-01-25 ASSESSMENT — MINNESOTA LIVING WITH HEART FAILURE QUESTIONNAIRE (MLHF)
MAKING YOU WORRY: 1
COSTING YOU MONEY FOR MEDICAL CARE: 1
WALKING ABOUT OR CLIMBING STAIRS DIFFICULT: 0
WORKING AROUND THE HOUSE OR YARD DIFFICULT: 1
MAKING YOU FEEL DEPRESSED: 1
MAKING YOU STAY IN A HOSPITAL: 0
TIRED, FATIGUED OR LOW ON ENERGY: 1
MAKING YOU SHORT OF BREATH: 1
DIFFICULTY SLEEPING WELL AT NIGHT: 0
DIFFICULTY WITH RECREATIONAL PASTIMES, SPORTS, HOBBIES: 1
SWELLING IN ANKLES OR LEGS: 0
HAVING TO SIT OR LIE DOWN DURING THE DAY: 0
EATING LESS FOODS YOU LIKE: 1
DIFFICULTY SOCIALIZING WITH FAMILY OR FRIENDS: 0
LOSS OF SELF CONTROL IN YOUR LIFE: 1
TOTAL_SCORE: 11
DIFFICULTY GOING AWAY FROM HOME: 0
DIFFICULTY WITH SEXUAL ACTIVITIES: 0
DIFFICULTY WORKING TO EARN A LIVING: 0
DIFFICULTY TO CONCENTRATE OR REMEMBERING THINGS: 1
FEELING LIKE A BURDEN TO FAMILY AND FRIENDS: 1
GIVING YOU SIDE EFFECTS FROM TREATMENTS: 0

## 2019-01-25 ASSESSMENT — 6 MINUTE WALK TEST (6MWT): TOTAL DISTANCE WALKED (METERS): 475

## 2019-01-25 NOTE — PROGRESS NOTES
Chief Complaint   Patient presents with   • CHF (Systolic)     F/V: 1 YR       Subjective:   Doug Arora is a 63 y.o. male who presents today for cardiac care for nonischemic cardiomyopathy with left ventricular systolic function documented at 20%. He did have a history of alcohol abuse along with methamphetamine abuse. He was in the hospital in August 2016 because of heart failure exacerbation. Since then, patient has stopped using methamphetamine and alcohol. He feels extremely well these days. He is currently taken carvedilol 25 mg by mouth twice a day, lisinopril 40 by mouth once a day and furosemide 20 mg by mouth bid.     At prior visit, patient was able to complete 415 m during his 6 minute walk test. his O2 saturation at baseline was 96% and at the end of the test, the O2 saturation was 95%. he reported 0.5 level of dyspnea on Jan scale.    01/25/2019 Patient was able to complete 475 m during his 6 minute walk test. his O2 saturation at baseline was 95% and at the end of the test, the O2 saturation was 96%. he reported 1 level of dyspnea on Jan scale.    Patient is feeling better these days. Does get winded upon walking up inclines or for distance. No symptoms at rest or with daily living activities.      Past Medical History:   Diagnosis Date   • Hep C w/o coma, chronic (HCC)    • HTN (hypertension) 9/7/2010     Past Surgical History:   Procedure Laterality Date   • HERNIA REPAIR     • TONSILLECTOMY       Family History   Problem Relation Age of Onset   • Stroke Mother    • Psychiatry Father    • Hypertension Father      Social History     Social History   • Marital status:      Spouse name: N/A   • Number of children: N/A   • Years of education: N/A     Occupational History   • Not on file.     Social History Main Topics   • Smoking status: Former Smoker     Packs/day: 1.50     Years: 24.00     Types: Cigarettes     Quit date: 12/1/2018   • Smokeless tobacco: Never Used   • Alcohol use Yes       "Comment: \"very rarely\"   • Drug use: Yes     Types: Methamphetamines   • Sexual activity: Yes     Partners: Female     Other Topics Concern   • Not on file     Social History Narrative   • No narrative on file     No Known Allergies  Outpatient Encounter Prescriptions as of 1/25/2019   Medication Sig Dispense Refill   • atorvastatin (LIPITOR) 20 MG Tab Take 1 Tab by mouth every day. 90 Tab 3   • carvedilol (COREG) 25 MG Tab Take 1 Tab by mouth 2 times a day, with meals. 180 Tab 3   • lisinopril (PRINIVIL, ZESTRIL) 40 MG tablet Take 1 Tab by mouth every day. 90 Tab 3   • spironolactone (ALDACTONE) 25 MG Tab TAKE ONE TABLET BY MOUTH ONE TIME DAILY 90 Tab 3   • furosemide (LASIX) 20 MG Tab Take 1 Tab by mouth 2 times a day. 180 Tab 3   • aspirin EC 81 MG EC tablet Take 1 Tab by mouth every day. 30 Tab 0   • [DISCONTINUED] carvedilol (COREG) 25 MG Tab Take 1 Tab by mouth 2 times a day, with meals. 180 Tab 3   • [DISCONTINUED] furosemide (LASIX) 20 MG Tab Take 1 Tab by mouth 2 times a day. 180 Tab 3   • [DISCONTINUED] atorvastatin (LIPITOR) 20 MG Tab Take 1 Tab by mouth every day. 90 Tab 3   • [DISCONTINUED] lisinopril (PRINIVIL, ZESTRIL) 40 MG tablet Take 1 Tab by mouth every day. 90 Tab 3   • [DISCONTINUED] spironolactone (ALDACTONE) 25 MG Tab TAKE ONE TABLET BY MOUTH ONE TIME DAILY 90 Tab 3   • multivitamin (THERAGRAN) Tab Take 1 Tab by mouth every day.       No facility-administered encounter medications on file as of 1/25/2019.      Review of Systems   Constitutional: Negative for chills, diaphoresis, fever, malaise/fatigue and weight loss.   HENT: Negative for ear discharge, ear pain, hearing loss and nosebleeds.    Eyes: Negative for blurred vision, double vision, pain and discharge.   Respiratory: Positive for shortness of breath. Negative for cough.    Cardiovascular: Negative for chest pain, palpitations, orthopnea, claudication, leg swelling and PND.   Gastrointestinal: Negative for abdominal pain, blood in " "stool, melena, nausea and vomiting.   Genitourinary: Negative for dysuria, frequency and hematuria.   Musculoskeletal: Negative for falls, joint pain and myalgias.   Skin: Negative for itching and rash.   Neurological: Negative for dizziness, sensory change, speech change, loss of consciousness and headaches.   Endo/Heme/Allergies: Negative for environmental allergies. Does not bruise/bleed easily.   Psychiatric/Behavioral: Negative for depression, hallucinations, memory loss and suicidal ideas.        Objective:   BP (!) 92/50 (BP Location: Left arm, Patient Position: Sitting, BP Cuff Size: Adult)   Pulse 82   Ht 1.753 m (5' 9\")   Wt 88.5 kg (195 lb)   SpO2 95%   BMI 28.80 kg/m²     Physical Exam   Constitutional: He is oriented to person, place, and time. No distress.   HENT:   Head: Normocephalic and atraumatic.   Right Ear: External ear normal.   Left Ear: External ear normal.   Eyes: Right eye exhibits no discharge. Left eye exhibits no discharge.   Neck: No JVD present. No thyromegaly present.   Cardiovascular: Normal rate, regular rhythm, normal heart sounds and intact distal pulses.  Exam reveals no gallop and no friction rub.    No murmur heard.  Pulmonary/Chest: Breath sounds normal. No respiratory distress.   Abdominal: Bowel sounds are normal. He exhibits no distension. There is no tenderness.   Musculoskeletal: He exhibits no edema or tenderness.   Neurological: He is alert and oriented to person, place, and time. No cranial nerve deficit.   Skin: Skin is warm and dry. He is not diaphoretic.   Psychiatric: He has a normal mood and affect. His behavior is normal.   Nursing note and vitals reviewed.      Assessment:     1. ACC/AHA stage C systolic heart failure (HCC)  carvedilol (COREG) 25 MG Tab    lisinopril (PRINIVIL, ZESTRIL) 40 MG tablet    spironolactone (ALDACTONE) 25 MG Tab    furosemide (LASIX) 20 MG Tab    COMP METABOLIC PANEL    LIPID PANEL    EC-ECHOCARDIOGRAM COMPLETE W/O CONT   2. " Non-ischemic cardiomyopathy (HCC)  carvedilol (COREG) 25 MG Tab    lisinopril (PRINIVIL, ZESTRIL) 40 MG tablet    spironolactone (ALDACTONE) 25 MG Tab    furosemide (LASIX) 20 MG Tab    COMP METABOLIC PANEL    LIPID PANEL    EC-ECHOCARDIOGRAM COMPLETE W/O CONT   3. HTN (hypertension), malignant  COMP METABOLIC PANEL    LIPID PANEL    EC-ECHOCARDIOGRAM COMPLETE W/O CONT   4. Mixed hyperlipidemia  atorvastatin (LIPITOR) 20 MG Tab    COMP METABOLIC PANEL    LIPID PANEL    EC-ECHOCARDIOGRAM COMPLETE W/O CONT   5. High risk medication use  COMP METABOLIC PANEL    LIPID PANEL    EC-ECHOCARDIOGRAM COMPLETE W/O CONT   6. Left ventricular systolic dysfunction, NYHA class 2  COMP METABOLIC PANEL    LIPID PANEL    EC-ECHOCARDIOGRAM COMPLETE W/O CONT       Medical Decision Making:  Today's Assessment / Status / Plan:   Today, based on physical examination findings, patient is euvolemic. No JVD, lungs are clear to auscultation, no pitting edema in bilateral lower extremities, no ascites.     Dry weight is 195 lbs.     Continue spironolactone 25 mg by mouth once a day.  Continue carvedilol at 25 mg by mouth twice a day, lisinopril 40 mg by mouth once a day and furosemide 20 mg by mouth twice a day.     Will re-evaluate with TTE to see LVEF now that patient is somewhat symptomatic. If LVEF is 35% or less, will meet indication for ICD.    Patient is reluctant to get the insurance done due to money issue.     I will see patient back in clinic with lab tests and studies results in 6 months.

## 2019-05-17 ENCOUNTER — HOSPITAL ENCOUNTER (OUTPATIENT)
Dept: CARDIOLOGY | Facility: MEDICAL CENTER | Age: 64
End: 2019-05-17
Attending: INTERNAL MEDICINE
Payer: COMMERCIAL

## 2019-05-17 DIAGNOSIS — I42.8 NON-ISCHEMIC CARDIOMYOPATHY (HCC): ICD-10-CM

## 2019-05-17 DIAGNOSIS — I51.89 LEFT VENTRICULAR SYSTOLIC DYSFUNCTION, NYHA CLASS 2: ICD-10-CM

## 2019-05-17 DIAGNOSIS — I50.20 ACC/AHA STAGE C SYSTOLIC HEART FAILURE (HCC): ICD-10-CM

## 2019-05-17 DIAGNOSIS — E78.2 MIXED HYPERLIPIDEMIA: ICD-10-CM

## 2019-05-17 DIAGNOSIS — I10 HTN (HYPERTENSION), MALIGNANT: ICD-10-CM

## 2019-05-17 DIAGNOSIS — Z79.899 HIGH RISK MEDICATION USE: ICD-10-CM

## 2019-05-17 PROCEDURE — 93306 TTE W/DOPPLER COMPLETE: CPT

## 2019-05-17 PROCEDURE — 93306 TTE W/DOPPLER COMPLETE: CPT | Mod: 26 | Performed by: INTERNAL MEDICINE

## 2019-05-20 LAB
LV EJECT FRACT  99904: 50
LV EJECT FRACT MOD 2C 99903: 80.4
LV EJECT FRACT MOD 4C 99902: 57.18
LV EJECT FRACT MOD BP 99901: 61.09

## 2019-05-21 NOTE — PROGRESS NOTES
Dear Fifi,    Can you please let Doug Maite know that result is ok and I will see patient as scheduled?    Thanks Mejia Calix.

## 2019-05-22 ENCOUNTER — TELEPHONE (OUTPATIENT)
Dept: CARDIOLOGY | Facility: MEDICAL CENTER | Age: 64
End: 2019-05-22

## 2019-05-28 DIAGNOSIS — E78.2 MIXED HYPERLIPIDEMIA: ICD-10-CM

## 2019-05-28 DIAGNOSIS — I42.8 NON-ISCHEMIC CARDIOMYOPATHY (HCC): ICD-10-CM

## 2019-05-28 DIAGNOSIS — I50.20 ACC/AHA STAGE C SYSTOLIC HEART FAILURE (HCC): ICD-10-CM

## 2019-05-28 RX ORDER — ATORVASTATIN CALCIUM 20 MG/1
20 TABLET, FILM COATED ORAL DAILY
Qty: 90 TAB | Refills: 3 | Status: SHIPPED | OUTPATIENT
Start: 2019-05-28 | End: 2019-07-12 | Stop reason: SDUPTHER

## 2019-05-28 RX ORDER — FUROSEMIDE 20 MG/1
20 TABLET ORAL 2 TIMES DAILY
Qty: 180 TAB | Refills: 3 | Status: SHIPPED | OUTPATIENT
Start: 2019-05-28 | End: 2019-07-12

## 2019-05-28 RX ORDER — CARVEDILOL 25 MG/1
25 TABLET ORAL 2 TIMES DAILY WITH MEALS
Qty: 180 TAB | Refills: 3 | Status: SHIPPED | OUTPATIENT
Start: 2019-05-28 | End: 2019-07-12 | Stop reason: SDUPTHER

## 2019-05-28 RX ORDER — SPIRONOLACTONE 25 MG/1
TABLET ORAL
Qty: 90 TAB | Refills: 3 | Status: SHIPPED | OUTPATIENT
Start: 2019-05-28 | End: 2019-07-12 | Stop reason: SDUPTHER

## 2019-05-28 RX ORDER — LISINOPRIL 40 MG/1
40 TABLET ORAL DAILY
Qty: 90 TAB | Refills: 3 | Status: SHIPPED | OUTPATIENT
Start: 2019-05-28 | End: 2019-07-12 | Stop reason: SDUPTHER

## 2019-06-30 LAB
ALBUMIN SERPL-MCNC: 3.8 G/DL (ref 3.6–4.8)
ALBUMIN/GLOB SERPL: 1.2 {RATIO} (ref 1.2–2.2)
ALP SERPL-CCNC: 53 IU/L (ref 39–117)
ALT SERPL-CCNC: 45 IU/L (ref 0–44)
AST SERPL-CCNC: 39 IU/L (ref 0–40)
BILIRUB SERPL-MCNC: 1.2 MG/DL (ref 0–1.2)
BUN SERPL-MCNC: 14 MG/DL (ref 8–27)
BUN/CREAT SERPL: 13 (ref 10–24)
CALCIUM SERPL-MCNC: 8.6 MG/DL (ref 8.6–10.2)
CHLORIDE SERPL-SCNC: 104 MMOL/L (ref 96–106)
CHOLEST SERPL-MCNC: 111 MG/DL (ref 100–199)
CO2 SERPL-SCNC: 21 MMOL/L (ref 20–29)
CREAT SERPL-MCNC: 1.07 MG/DL (ref 0.76–1.27)
GLOBULIN SER CALC-MCNC: 3.3 G/DL (ref 1.5–4.5)
GLUCOSE SERPL-MCNC: 157 MG/DL (ref 65–99)
HDLC SERPL-MCNC: 32 MG/DL
LABORATORY COMMENT REPORT: ABNORMAL
LDLC SERPL CALC-MCNC: 44 MG/DL (ref 0–99)
POTASSIUM SERPL-SCNC: 4.8 MMOL/L (ref 3.5–5.2)
PROT SERPL-MCNC: 7.1 G/DL (ref 6–8.5)
SODIUM SERPL-SCNC: 137 MMOL/L (ref 134–144)
TRIGL SERPL-MCNC: 175 MG/DL (ref 0–149)
VLDLC SERPL CALC-MCNC: 35 MG/DL (ref 5–40)

## 2019-07-12 ENCOUNTER — OFFICE VISIT (OUTPATIENT)
Dept: CARDIOLOGY | Facility: MEDICAL CENTER | Age: 64
End: 2019-07-12
Payer: COMMERCIAL

## 2019-07-12 VITALS
BODY MASS INDEX: 30.07 KG/M2 | WEIGHT: 203 LBS | HEIGHT: 69 IN | DIASTOLIC BLOOD PRESSURE: 52 MMHG | OXYGEN SATURATION: 96 % | SYSTOLIC BLOOD PRESSURE: 88 MMHG | HEART RATE: 86 BPM

## 2019-07-12 DIAGNOSIS — I42.8 NON-ISCHEMIC CARDIOMYOPATHY (HCC): ICD-10-CM

## 2019-07-12 DIAGNOSIS — Z79.899 HIGH RISK MEDICATION USE: ICD-10-CM

## 2019-07-12 DIAGNOSIS — E78.2 MIXED HYPERLIPIDEMIA: ICD-10-CM

## 2019-07-12 DIAGNOSIS — I50.9 HEART FAILURE, NYHA CLASS 1 (HCC): ICD-10-CM

## 2019-07-12 DIAGNOSIS — I10 HTN (HYPERTENSION), MALIGNANT: ICD-10-CM

## 2019-07-12 DIAGNOSIS — I50.20 ACC/AHA STAGE C SYSTOLIC HEART FAILURE (HCC): ICD-10-CM

## 2019-07-12 PROCEDURE — 99214 OFFICE O/P EST MOD 30 MIN: CPT | Performed by: INTERNAL MEDICINE

## 2019-07-12 RX ORDER — ATORVASTATIN CALCIUM 20 MG/1
20 TABLET, FILM COATED ORAL DAILY
Qty: 90 TAB | Refills: 3 | Status: SHIPPED | OUTPATIENT
Start: 2019-07-12 | End: 2020-01-22 | Stop reason: SDUPTHER

## 2019-07-12 RX ORDER — SPIRONOLACTONE 25 MG/1
TABLET ORAL
Qty: 90 TAB | Refills: 3 | Status: SHIPPED | OUTPATIENT
Start: 2019-07-12 | End: 2020-01-22 | Stop reason: SDUPTHER

## 2019-07-12 RX ORDER — LISINOPRIL 40 MG/1
40 TABLET ORAL DAILY
Qty: 90 TAB | Refills: 3 | Status: SHIPPED | OUTPATIENT
Start: 2019-07-12 | End: 2020-01-22 | Stop reason: SDUPTHER

## 2019-07-12 RX ORDER — CARVEDILOL 25 MG/1
25 TABLET ORAL 2 TIMES DAILY WITH MEALS
Qty: 180 TAB | Refills: 3 | Status: SHIPPED | OUTPATIENT
Start: 2019-07-12 | End: 2020-01-22 | Stop reason: SDUPTHER

## 2019-07-12 ASSESSMENT — ENCOUNTER SYMPTOMS
EYE PAIN: 0
CHILLS: 0
HALLUCINATIONS: 0
EYE DISCHARGE: 0
ABDOMINAL PAIN: 0
NAUSEA: 0
ORTHOPNEA: 0
DEPRESSION: 0
MYALGIAS: 0
LOSS OF CONSCIOUSNESS: 0
DIZZINESS: 0
FALLS: 0
BRUISES/BLEEDS EASILY: 0
VOMITING: 0
BLOOD IN STOOL: 0
WEIGHT LOSS: 0
PND: 0
SHORTNESS OF BREATH: 0
SENSORY CHANGE: 0
HEADACHES: 0
PALPITATIONS: 0
SPEECH CHANGE: 0
CLAUDICATION: 0
DOUBLE VISION: 0
FEVER: 0
COUGH: 0
BLURRED VISION: 0

## 2019-07-12 NOTE — PROGRESS NOTES
Chief Complaint   Patient presents with   • Congestive Heart Failure       Subjective:   Doug Arora is a 63 y.o. male who presents today for cardiac care for nonischemic cardiomyopathy with left ventricular systolic function documented at 20%. He did have a history of alcohol abuse along with methamphetamine abuse. He was in the hospital in August 2016 because of heart failure exacerbation. Since then, patient has stopped using methamphetamine and alcohol. He feels extremely well these days. He is currently taken carvedilol 25 mg by mouth twice a day, lisinopril 40 by mouth once a day and furosemide 20 mg by mouth bid. His LVEF has improved to 50% with medical therapy and cessation from illicit drugs use. LVEF of 50 % with global hypokinesis. (05/2019) No significant valvular disease. I have independently interpreted and reviewed echocardiogram's actual images.      At prior visit, patient was able to complete 415 m during his 6 minute walk test. his O2 saturation at baseline was 96% and at the end of the test, the O2 saturation was 95%. he reported 0.5 level of dyspnea on Jan scale.     01/25/2019 Patient was able to complete 475 m during his 6 minute walk test. his O2 saturation at baseline was 95% and at the end of the test, the O2 saturation was 96%. he reported 1 level of dyspnea on Jan scale.     Patient is feeling better these days. No exertional dyspnea. No symptoms at rest or with daily living activities.    Past Medical History:   Diagnosis Date   • Hep C w/o coma, chronic (HCC)    • HTN (hypertension) 9/7/2010     Past Surgical History:   Procedure Laterality Date   • HERNIA REPAIR     • TONSILLECTOMY       Family History   Problem Relation Age of Onset   • Stroke Mother    • Psychiatry Father    • Hypertension Father      Social History     Social History   • Marital status:      Spouse name: N/A   • Number of children: N/A   • Years of education: N/A     Occupational History   • Not on file.  "    Social History Main Topics   • Smoking status: Former Smoker     Packs/day: 1.50     Years: 24.00     Types: Cigarettes     Quit date: 12/1/2018   • Smokeless tobacco: Never Used   • Alcohol use Yes      Comment: \"very rarely\"   • Drug use: Yes     Types: Methamphetamines   • Sexual activity: Yes     Partners: Female     Other Topics Concern   • Not on file     Social History Narrative   • No narrative on file     No Known Allergies  Outpatient Encounter Prescriptions as of 7/12/2019   Medication Sig Dispense Refill   • lisinopril (PRINIVIL, ZESTRIL) 40 MG tablet Take 1 Tab by mouth every day. 90 Tab 3   • carvedilol (COREG) 25 MG Tab Take 1 Tab by mouth 2 times a day, with meals. 180 Tab 3   • atorvastatin (LIPITOR) 20 MG Tab Take 1 Tab by mouth every day. 90 Tab 3   • spironolactone (ALDACTONE) 25 MG Tab TAKE ONE TABLET BY MOUTH ONE TIME DAILY 90 Tab 3   • multivitamin (THERAGRAN) Tab Take 1 Tab by mouth every day.     • aspirin EC 81 MG EC tablet Take 1 Tab by mouth every day. 30 Tab 0   • [DISCONTINUED] atorvastatin (LIPITOR) 20 MG Tab Take 1 Tab by mouth every day. 90 Tab 3   • [DISCONTINUED] carvedilol (COREG) 25 MG Tab Take 1 Tab by mouth 2 times a day, with meals. 180 Tab 3   • [DISCONTINUED] furosemide (LASIX) 20 MG Tab Take 1 Tab by mouth 2 times a day. 180 Tab 3   • [DISCONTINUED] lisinopril (PRINIVIL, ZESTRIL) 40 MG tablet Take 1 Tab by mouth every day. 90 Tab 3   • [DISCONTINUED] spironolactone (ALDACTONE) 25 MG Tab TAKE ONE TABLET BY MOUTH ONE TIME DAILY 90 Tab 3     No facility-administered encounter medications on file as of 7/12/2019.      Review of Systems   Constitutional: Negative for chills, fever, malaise/fatigue and weight loss.   HENT: Negative for ear discharge, ear pain, hearing loss and nosebleeds.    Eyes: Negative for blurred vision, double vision, pain and discharge.   Respiratory: Negative for cough and shortness of breath.    Cardiovascular: Negative for chest pain, palpitations, " "orthopnea, claudication, leg swelling and PND.   Gastrointestinal: Negative for abdominal pain, blood in stool, melena, nausea and vomiting.   Genitourinary: Negative for dysuria and hematuria.   Musculoskeletal: Negative for falls, joint pain and myalgias.   Skin: Negative for itching and rash.   Neurological: Negative for dizziness, sensory change, speech change, loss of consciousness and headaches.   Endo/Heme/Allergies: Negative for environmental allergies. Does not bruise/bleed easily.   Psychiatric/Behavioral: Negative for depression, hallucinations and suicidal ideas.        Objective:   BP (!) 88/52 (BP Location: Right arm, Patient Position: Sitting, BP Cuff Size: Adult)   Pulse 86   Ht 1.753 m (5' 9\")   Wt 92.1 kg (203 lb)   SpO2 96%   BMI 29.98 kg/m²     Physical Exam   Constitutional: He is oriented to person, place, and time. No distress.   HENT:   Head: Normocephalic and atraumatic.   Right Ear: External ear normal.   Left Ear: External ear normal.   Eyes: Right eye exhibits no discharge. Left eye exhibits no discharge.   Neck: No JVD present. No thyromegaly present.   Cardiovascular: Normal rate, regular rhythm, normal heart sounds and intact distal pulses.  Exam reveals no gallop and no friction rub.    No murmur heard.  Pulmonary/Chest: Breath sounds normal. No respiratory distress.   Abdominal: Bowel sounds are normal. He exhibits no distension. There is no tenderness.   Musculoskeletal: He exhibits no edema or tenderness.   Neurological: He is alert and oriented to person, place, and time. No cranial nerve deficit.   Skin: Skin is warm and dry. He is not diaphoretic.   Psychiatric: He has a normal mood and affect. His behavior is normal.   Nursing note and vitals reviewed.      Assessment:     1. ACC/AHA stage C systolic heart failure (HCC) LVEF improved to 50%  lisinopril (PRINIVIL, ZESTRIL) 40 MG tablet    carvedilol (COREG) 25 MG Tab    spironolactone (ALDACTONE) 25 MG Tab   2. Heart " failure, NYHA class 1 (HCC)     3. HTN (hypertension), malignant     4. Mixed hyperlipidemia  atorvastatin (LIPITOR) 20 MG Tab    LIPID PANEL   5. Non-ischemic cardiomyopathy (HCC)  lisinopril (PRINIVIL, ZESTRIL) 40 MG tablet    carvedilol (COREG) 25 MG Tab    spironolactone (ALDACTONE) 25 MG Tab   6. High risk medication use  Comp Metabolic Panel       Medical Decision Making:  Today's Assessment / Status / Plan:   Non- Ischemic Cardiomyopathy:  His LVEF has improved to 50% with medical therapy and cessation from illicit drugs use.  Chronically illed condition which requires ongoing close monitoring and treatment to improve survival rate along with decreasing risk of clinical decompensation and hospitalization.    Today, based on physical examination findings, patient is euvolemic. No JVD, lungs are clear to auscultation, no pitting edema in bilateral lower extremities, no ascites.     Dry weight is 203 lbs.     Continue Lisinopril 40 mg po daily, coreg 25 mg po bid.     Will stop diuretic (lasix) as blood pressure is borderline.     Aldactone antagonist with Spironolactone 25 mg daily.    No indication for ICD placement as LVEF has improved to 50%.    Hypertension:  Blood pressure is borderline. He is asymptomatic. Might be dry. Will stop furosemide.    Hyperlipidemia:  Optimize statin with Atorvastatin 20 mg qhs.    I will see patient back in clinic with lab tests and studies results in 6 months.    I thank you for referring patient to our Cardiology Clinic today.

## 2019-07-18 ENCOUNTER — OCCUPATIONAL MEDICINE (OUTPATIENT)
Dept: URGENT CARE | Facility: PHYSICIAN GROUP | Age: 64
End: 2019-07-18
Payer: COMMERCIAL

## 2019-07-18 ENCOUNTER — APPOINTMENT (OUTPATIENT)
Dept: RADIOLOGY | Facility: IMAGING CENTER | Age: 64
End: 2019-07-18
Attending: PHYSICIAN ASSISTANT
Payer: COMMERCIAL

## 2019-07-18 VITALS
TEMPERATURE: 97.5 F | BODY MASS INDEX: 29.77 KG/M2 | WEIGHT: 201 LBS | HEART RATE: 63 BPM | OXYGEN SATURATION: 97 % | DIASTOLIC BLOOD PRESSURE: 74 MMHG | HEIGHT: 69 IN | SYSTOLIC BLOOD PRESSURE: 126 MMHG

## 2019-07-18 DIAGNOSIS — S16.1XXA ACUTE STRAIN OF NECK MUSCLE, INITIAL ENCOUNTER: ICD-10-CM

## 2019-07-18 DIAGNOSIS — Z02.1 PRE-EMPLOYMENT DRUG SCREENING: ICD-10-CM

## 2019-07-18 LAB
AMP AMPHETAMINE: NORMAL
COC COCAINE: NORMAL
INT CON NEG: NEGATIVE
INT CON POS: POSITIVE
MET METHAMPHETAMINES: NORMAL
OPI OPIATES: NORMAL
PCP PHENCYCLIDINE: NORMAL
POC DRUG COMMENT 753798-OCCUPATIONAL HEALTH: NEGATIVE
THC: NORMAL

## 2019-07-18 PROCEDURE — 99204 OFFICE O/P NEW MOD 45 MIN: CPT | Mod: 29 | Performed by: PHYSICIAN ASSISTANT

## 2019-07-18 PROCEDURE — 80305 DRUG TEST PRSMV DIR OPT OBS: CPT | Performed by: PHYSICIAN ASSISTANT

## 2019-07-18 PROCEDURE — 72040 X-RAY EXAM NECK SPINE 2-3 VW: CPT | Mod: TC,29 | Performed by: PHYSICIAN ASSISTANT

## 2019-07-18 RX ORDER — IBUPROFEN 600 MG/1
600 TABLET ORAL EVERY 6 HOURS PRN
Qty: 30 TAB | Refills: 0 | Status: SHIPPED | OUTPATIENT
Start: 2019-07-18 | End: 2019-08-06

## 2019-07-18 RX ORDER — CYCLOBENZAPRINE HCL 5 MG
5-10 TABLET ORAL 3 TIMES DAILY PRN
Qty: 30 TAB | Refills: 0 | Status: SHIPPED | OUTPATIENT
Start: 2019-07-18 | End: 2019-08-06

## 2019-07-18 ASSESSMENT — ENCOUNTER SYMPTOMS
ABDOMINAL PAIN: 0
NECK PAIN: 1
HEADACHES: 1
VISUAL CHANGE: 0
VERTIGO: 0
NUMBNESS: 0
WEAKNESS: 0
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
## 2019-07-19 NOTE — PROGRESS NOTES
I agree with the assessment and plan of Anya Hernandes PA-C  I personally examined the patient.    Gallo Martin P.A.-C.

## 2019-07-22 ENCOUNTER — OCCUPATIONAL MEDICINE (OUTPATIENT)
Dept: URGENT CARE | Facility: PHYSICIAN GROUP | Age: 64
End: 2019-07-22
Payer: COMMERCIAL

## 2019-07-22 VITALS
WEIGHT: 201 LBS | HEART RATE: 64 BPM | SYSTOLIC BLOOD PRESSURE: 110 MMHG | TEMPERATURE: 97.9 F | DIASTOLIC BLOOD PRESSURE: 78 MMHG | RESPIRATION RATE: 18 BRPM | HEIGHT: 69 IN | BODY MASS INDEX: 29.77 KG/M2 | OXYGEN SATURATION: 97 %

## 2019-07-22 DIAGNOSIS — S16.1XXD STRAIN OF NECK MUSCLE, SUBSEQUENT ENCOUNTER: ICD-10-CM

## 2019-07-22 PROCEDURE — 99213 OFFICE O/P EST LOW 20 MIN: CPT | Mod: 29 | Performed by: PHYSICIAN ASSISTANT

## 2019-07-22 ASSESSMENT — ENCOUNTER SYMPTOMS
ABDOMINAL PAIN: 0
FEVER: 0
VOMITING: 0
NAUSEA: 0
DIARRHEA: 0
SHORTNESS OF BREATH: 0
CHILLS: 0
MUSCULOSKELETAL NEGATIVE: 1
DIZZINESS: 0
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
## 2019-07-30 ENCOUNTER — OCCUPATIONAL MEDICINE (OUTPATIENT)
Dept: URGENT CARE | Facility: PHYSICIAN GROUP | Age: 64
End: 2019-07-30
Payer: COMMERCIAL

## 2019-07-30 VITALS
DIASTOLIC BLOOD PRESSURE: 68 MMHG | HEART RATE: 83 BPM | BODY MASS INDEX: 30.21 KG/M2 | OXYGEN SATURATION: 94 % | HEIGHT: 69 IN | WEIGHT: 204 LBS | TEMPERATURE: 98.3 F | SYSTOLIC BLOOD PRESSURE: 122 MMHG

## 2019-07-30 DIAGNOSIS — S16.1XXD STRAIN OF NECK MUSCLE, SUBSEQUENT ENCOUNTER: ICD-10-CM
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
## 2019-07-30 PROCEDURE — 99212 OFFICE O/P EST SF 10 MIN: CPT | Mod: 29 | Performed by: PHYSICIAN ASSISTANT
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
## 2019-08-06 ENCOUNTER — OCCUPATIONAL MEDICINE (OUTPATIENT)
Dept: URGENT CARE | Facility: PHYSICIAN GROUP | Age: 64
End: 2019-08-06
Payer: COMMERCIAL

## 2019-08-06 VITALS
WEIGHT: 204 LBS | OXYGEN SATURATION: 91 % | SYSTOLIC BLOOD PRESSURE: 130 MMHG | HEIGHT: 69 IN | RESPIRATION RATE: 16 BRPM | DIASTOLIC BLOOD PRESSURE: 76 MMHG | TEMPERATURE: 97.6 F | BODY MASS INDEX: 30.21 KG/M2 | HEART RATE: 82 BPM

## 2019-08-06 DIAGNOSIS — S46.912D STRAIN OF LEFT SHOULDER, SUBSEQUENT ENCOUNTER: ICD-10-CM

## 2019-08-06 DIAGNOSIS — S16.1XXD STRAIN OF NECK MUSCLE, SUBSEQUENT ENCOUNTER: ICD-10-CM

## 2019-08-06 PROCEDURE — 99214 OFFICE O/P EST MOD 30 MIN: CPT | Mod: 29 | Performed by: PHYSICIAN ASSISTANT

## 2019-08-06 RX ORDER — KETOROLAC TROMETHAMINE 30 MG/ML
30 INJECTION, SOLUTION INTRAMUSCULAR; INTRAVENOUS ONCE
Status: COMPLETED | OUTPATIENT
Start: 2019-08-06 | End: 2019-08-06

## 2019-08-06 RX ORDER — IBUPROFEN 600 MG/1
600 TABLET ORAL EVERY 6 HOURS PRN
Qty: 30 TAB | Refills: 0 | Status: SHIPPED | OUTPATIENT
Start: 2019-08-06 | End: 2021-06-11

## 2019-08-06 RX ORDER — CYCLOBENZAPRINE HCL 5 MG
5-10 TABLET ORAL 3 TIMES DAILY PRN
Qty: 30 TAB | Refills: 0 | Status: SHIPPED | OUTPATIENT
Start: 2019-08-06 | End: 2021-06-11

## 2019-08-06 RX ADMIN — KETOROLAC TROMETHAMINE 30 MG: 30 INJECTION, SOLUTION INTRAMUSCULAR; INTRAVENOUS at 14:59

## 2019-08-06 ASSESSMENT — ENCOUNTER SYMPTOMS
DIARRHEA: 0
FALLS: 0
MYALGIAS: 0
BACK PAIN: 1
FOCAL WEAKNESS: 0
TREMORS: 0
NECK PAIN: 1
EYE DISCHARGE: 0
EYE REDNESS: 0
TINGLING: 1
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
## 2019-09-12 ENCOUNTER — TELEPHONE (OUTPATIENT)
Dept: OCCUPATIONAL MEDICINE | Facility: CLINIC | Age: 64
End: 2019-09-12

## 2019-09-19 ENCOUNTER — OCCUPATIONAL MEDICINE (OUTPATIENT)
Dept: OCCUPATIONAL MEDICINE | Facility: CLINIC | Age: 64
End: 2019-09-19
Payer: COMMERCIAL

## 2019-09-19 VITALS
DIASTOLIC BLOOD PRESSURE: 80 MMHG | SYSTOLIC BLOOD PRESSURE: 110 MMHG | WEIGHT: 202 LBS | OXYGEN SATURATION: 94 % | BODY MASS INDEX: 29.92 KG/M2 | TEMPERATURE: 98 F | HEART RATE: 82 BPM | HEIGHT: 69 IN
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
## 2019-09-19 DIAGNOSIS — S16.1XXD STRAIN OF NECK MUSCLE, SUBSEQUENT ENCOUNTER: ICD-10-CM

## 2019-09-19 PROCEDURE — 99213 OFFICE O/P EST LOW 20 MIN: CPT | Mod: 29 | Performed by: NURSE PRACTITIONER

## 2019-09-19 SDOH — HEALTH STABILITY: MENTAL HEALTH: HOW OFTEN DO YOU HAVE A DRINK CONTAINING ALCOHOL?: NOT ASKED

## 2019-09-19 ASSESSMENT — ENCOUNTER SYMPTOMS
NECK PAIN: 1
MYALGIAS: 1
CARDIOVASCULAR NEGATIVE: 1
RESPIRATORY NEGATIVE: 1
CONSTITUTIONAL NEGATIVE: 1
PSYCHIATRIC NEGATIVE: 1
NEUROLOGICAL NEGATIVE: 1
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
## 2019-10-10 ENCOUNTER — OCCUPATIONAL MEDICINE (OUTPATIENT)
Dept: OCCUPATIONAL MEDICINE | Facility: CLINIC | Age: 64
End: 2019-10-10
Payer: COMMERCIAL

## 2019-10-10 VITALS — SYSTOLIC BLOOD PRESSURE: 110 MMHG | DIASTOLIC BLOOD PRESSURE: 72 MMHG | WEIGHT: 202 LBS | BODY MASS INDEX: 29.83 KG/M2

## 2019-10-10 DIAGNOSIS — S16.1XXD STRAIN OF NECK MUSCLE, SUBSEQUENT ENCOUNTER: ICD-10-CM

## 2019-10-10 DIAGNOSIS — S46.912D STRAIN OF LEFT SHOULDER, SUBSEQUENT ENCOUNTER: ICD-10-CM

## 2019-10-10 PROCEDURE — 99213 OFFICE O/P EST LOW 20 MIN: CPT | Mod: 29 | Performed by: NURSE PRACTITIONER

## 2019-10-10 ASSESSMENT — ENCOUNTER SYMPTOMS
NEUROLOGICAL NEGATIVE: 1
PSYCHIATRIC NEGATIVE: 1
CONSTITUTIONAL NEGATIVE: 1
MYALGIAS: 1
NECK PAIN: 1
RESPIRATORY NEGATIVE: 1
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
## 2019-10-19 ENCOUNTER — HOSPITAL ENCOUNTER (OUTPATIENT)
Dept: RADIOLOGY | Facility: MEDICAL CENTER | Age: 64
End: 2019-10-19
Attending: NURSE PRACTITIONER
Payer: COMMERCIAL

## 2019-10-19 DIAGNOSIS — S16.1XXD STRAIN OF NECK MUSCLE, SUBSEQUENT ENCOUNTER: ICD-10-CM

## 2019-10-19 PROCEDURE — 72141 MRI NECK SPINE W/O DYE: CPT

## 2019-10-31 ENCOUNTER — TELEPHONE (OUTPATIENT)
Dept: OCCUPATIONAL MEDICINE | Facility: CLINIC | Age: 64
End: 2019-10-31

## 2019-11-01 ENCOUNTER — OCCUPATIONAL MEDICINE (OUTPATIENT)
Dept: OCCUPATIONAL MEDICINE | Facility: CLINIC | Age: 64
End: 2019-11-01
Payer: COMMERCIAL

## 2019-11-01 VITALS
OXYGEN SATURATION: 94 % | DIASTOLIC BLOOD PRESSURE: 84 MMHG | WEIGHT: 202 LBS | TEMPERATURE: 98.2 F | SYSTOLIC BLOOD PRESSURE: 122 MMHG | HEART RATE: 84 BPM | RESPIRATION RATE: 16 BRPM | HEIGHT: 69 IN | BODY MASS INDEX: 29.92 KG/M2

## 2019-11-01 DIAGNOSIS — S46.912D STRAIN OF LEFT SHOULDER, SUBSEQUENT ENCOUNTER: ICD-10-CM

## 2019-11-01 DIAGNOSIS — S16.1XXD STRAIN OF NECK MUSCLE, SUBSEQUENT ENCOUNTER: ICD-10-CM

## 2019-11-01 PROCEDURE — 99213 OFFICE O/P EST LOW 20 MIN: CPT | Mod: 29 | Performed by: NURSE PRACTITIONER

## 2019-11-01 ASSESSMENT — ENCOUNTER SYMPTOMS
NECK PAIN: 1
CONSTITUTIONAL NEGATIVE: 1
PSYCHIATRIC NEGATIVE: 1
NEUROLOGICAL NEGATIVE: 1
CARDIOVASCULAR NEGATIVE: 1
MYALGIAS: 1
BACK PAIN: 1
RESPIRATORY NEGATIVE: 1
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
## 2020-01-22 DIAGNOSIS — I50.20 ACC/AHA STAGE C SYSTOLIC HEART FAILURE (HCC): ICD-10-CM

## 2020-01-22 DIAGNOSIS — E78.2 MIXED HYPERLIPIDEMIA: ICD-10-CM

## 2020-01-22 DIAGNOSIS — I42.8 NON-ISCHEMIC CARDIOMYOPATHY (HCC): ICD-10-CM

## 2020-01-22 RX ORDER — CARVEDILOL 25 MG/1
25 TABLET ORAL 2 TIMES DAILY WITH MEALS
Qty: 180 TAB | Refills: 0 | Status: SHIPPED | OUTPATIENT
Start: 2020-01-22 | End: 2020-03-31 | Stop reason: SDUPTHER

## 2020-01-22 RX ORDER — SPIRONOLACTONE 25 MG/1
TABLET ORAL
Qty: 90 TAB | Refills: 0 | Status: SHIPPED | OUTPATIENT
Start: 2020-01-22 | End: 2020-03-31 | Stop reason: SDUPTHER

## 2020-01-22 RX ORDER — ATORVASTATIN CALCIUM 20 MG/1
20 TABLET, FILM COATED ORAL DAILY
Qty: 90 TAB | Refills: 0 | Status: SHIPPED | OUTPATIENT
Start: 2020-01-22 | End: 2020-03-31 | Stop reason: SDUPTHER

## 2020-01-22 RX ORDER — LISINOPRIL 40 MG/1
40 TABLET ORAL DAILY
Qty: 90 TAB | Refills: 0 | Status: SHIPPED | OUTPATIENT
Start: 2020-01-22 | End: 2020-03-31 | Stop reason: SDUPTHER

## 2020-03-31 DIAGNOSIS — I50.20 ACC/AHA STAGE C SYSTOLIC HEART FAILURE (HCC): ICD-10-CM

## 2020-03-31 DIAGNOSIS — E78.2 MIXED HYPERLIPIDEMIA: ICD-10-CM

## 2020-03-31 DIAGNOSIS — I42.8 NON-ISCHEMIC CARDIOMYOPATHY (HCC): ICD-10-CM

## 2020-03-31 RX ORDER — SPIRONOLACTONE 25 MG/1
25 TABLET ORAL DAILY
Qty: 90 TAB | Refills: 1 | Status: SHIPPED | OUTPATIENT
Start: 2020-03-31 | End: 2020-07-30 | Stop reason: SDUPTHER

## 2020-03-31 RX ORDER — LISINOPRIL 40 MG/1
40 TABLET ORAL DAILY
Qty: 90 TAB | Refills: 1 | Status: SHIPPED | OUTPATIENT
Start: 2020-03-31 | End: 2020-07-30 | Stop reason: SDUPTHER

## 2020-03-31 RX ORDER — ATORVASTATIN CALCIUM 20 MG/1
20 TABLET, FILM COATED ORAL DAILY
Qty: 90 TAB | Refills: 1 | Status: SHIPPED | OUTPATIENT
Start: 2020-03-31 | End: 2020-07-30 | Stop reason: SDUPTHER

## 2020-03-31 RX ORDER — CARVEDILOL 25 MG/1
25 TABLET ORAL 2 TIMES DAILY WITH MEALS
Qty: 180 TAB | Refills: 1 | Status: SHIPPED | OUTPATIENT
Start: 2020-03-31 | End: 2020-07-30 | Stop reason: SDUPTHER

## 2020-07-30 DIAGNOSIS — I42.8 NON-ISCHEMIC CARDIOMYOPATHY (HCC): ICD-10-CM

## 2020-07-30 DIAGNOSIS — I50.20 ACC/AHA STAGE C SYSTOLIC HEART FAILURE (HCC): ICD-10-CM

## 2020-07-30 DIAGNOSIS — E78.2 MIXED HYPERLIPIDEMIA: ICD-10-CM

## 2020-07-31 RX ORDER — ATORVASTATIN CALCIUM 20 MG/1
20 TABLET, FILM COATED ORAL DAILY
Qty: 90 TAB | Refills: 0 | Status: SHIPPED | OUTPATIENT
Start: 2020-07-31 | End: 2020-08-04 | Stop reason: SDUPTHER

## 2020-07-31 RX ORDER — CARVEDILOL 25 MG/1
25 TABLET ORAL 2 TIMES DAILY WITH MEALS
Qty: 180 TAB | Refills: 0 | Status: SHIPPED | OUTPATIENT
Start: 2020-07-31 | End: 2020-08-04 | Stop reason: SDUPTHER

## 2020-07-31 RX ORDER — SPIRONOLACTONE 25 MG/1
25 TABLET ORAL DAILY
Qty: 90 TAB | Refills: 0 | Status: SHIPPED | OUTPATIENT
Start: 2020-07-31 | End: 2020-08-04 | Stop reason: SDUPTHER

## 2020-07-31 RX ORDER — LISINOPRIL 40 MG/1
40 TABLET ORAL DAILY
Qty: 90 TAB | Refills: 0 | Status: SHIPPED | OUTPATIENT
Start: 2020-07-31 | End: 2020-08-04 | Stop reason: SDUPTHER

## 2020-08-04 ENCOUNTER — OFFICE VISIT (OUTPATIENT)
Dept: CARDIOLOGY | Facility: MEDICAL CENTER | Age: 65
End: 2020-08-04
Payer: COMMERCIAL

## 2020-08-04 VITALS
BODY MASS INDEX: 30.18 KG/M2 | DIASTOLIC BLOOD PRESSURE: 72 MMHG | HEIGHT: 69 IN | RESPIRATION RATE: 16 BRPM | HEART RATE: 61 BPM | WEIGHT: 203.8 LBS | OXYGEN SATURATION: 96 % | SYSTOLIC BLOOD PRESSURE: 134 MMHG

## 2020-08-04 DIAGNOSIS — I50.9 HEART FAILURE, NYHA CLASS 1 (HCC): ICD-10-CM

## 2020-08-04 DIAGNOSIS — I42.8 NON-ISCHEMIC CARDIOMYOPATHY (HCC): ICD-10-CM

## 2020-08-04 DIAGNOSIS — I50.20 ACC/AHA STAGE C SYSTOLIC HEART FAILURE (HCC): ICD-10-CM

## 2020-08-04 DIAGNOSIS — E78.2 MIXED HYPERLIPIDEMIA: ICD-10-CM

## 2020-08-04 DIAGNOSIS — I10 HTN (HYPERTENSION), MALIGNANT: ICD-10-CM

## 2020-08-04 DIAGNOSIS — Z79.899 HIGH RISK MEDICATION USE: ICD-10-CM

## 2020-08-04 PROCEDURE — 99214 OFFICE O/P EST MOD 30 MIN: CPT | Performed by: INTERNAL MEDICINE

## 2020-08-04 RX ORDER — ATORVASTATIN CALCIUM 20 MG/1
20 TABLET, FILM COATED ORAL DAILY
Qty: 90 TAB | Refills: 4 | Status: SHIPPED | OUTPATIENT
Start: 2020-08-04 | End: 2021-06-11 | Stop reason: SDUPTHER

## 2020-08-04 RX ORDER — CARVEDILOL 25 MG/1
25 TABLET ORAL 2 TIMES DAILY WITH MEALS
Qty: 180 TAB | Refills: 4 | Status: SHIPPED | OUTPATIENT
Start: 2020-08-04 | End: 2021-06-11 | Stop reason: SDUPTHER

## 2020-08-04 RX ORDER — LISINOPRIL 40 MG/1
40 TABLET ORAL DAILY
Qty: 90 TAB | Refills: 4 | Status: SHIPPED | OUTPATIENT
Start: 2020-08-04 | End: 2021-06-11 | Stop reason: SDUPTHER

## 2020-08-04 RX ORDER — SPIRONOLACTONE 25 MG/1
25 TABLET ORAL DAILY
Qty: 90 TAB | Refills: 4 | Status: SHIPPED | OUTPATIENT
Start: 2020-08-04 | End: 2021-06-11 | Stop reason: SDUPTHER

## 2020-08-04 ASSESSMENT — ENCOUNTER SYMPTOMS
SPEECH CHANGE: 0
CHILLS: 0
COUGH: 0
DEPRESSION: 0
HALLUCINATIONS: 0
MYALGIAS: 0
ORTHOPNEA: 0
BRUISES/BLEEDS EASILY: 0
PALPITATIONS: 0
EYE DISCHARGE: 0
VOMITING: 0
BLURRED VISION: 0
NAUSEA: 0
DOUBLE VISION: 0
FALLS: 0
WEIGHT LOSS: 0
SENSORY CHANGE: 0
CLAUDICATION: 0
LOSS OF CONSCIOUSNESS: 0
SHORTNESS OF BREATH: 0
DIZZINESS: 0
PND: 0
FEVER: 0
HEADACHES: 0
ABDOMINAL PAIN: 0
BLOOD IN STOOL: 0
EYE PAIN: 0

## 2020-08-04 NOTE — PROGRESS NOTES
Chief Complaint   Patient presents with   • Congestive Heart Failure       Subjective:   Doug Arora is a 65 y.o. male who presents today for cardiac care for nonischemic cardiomyopathy with left ventricular systolic function documented at 20%. He did have a history of alcohol abuse along with methamphetamine abuse. He was in the hospital in August 2016 because of heart failure exacerbation. Since then, patient has stopped using methamphetamine and alcohol. He feels extremely well these days. He is currently taken carvedilol 25 mg by mouth twice a day, lisinopril 40 by mouth once a day and furosemide 20 mg by mouth bid. His LVEF has improved to 50% with medical therapy and cessation from illicit drugs use. LVEF of 50 % with global hypokinesis. (05/2019) No significant valvular disease. I have independently interpreted and reviewed echocardiogram's actual images.      At prior visit, patient was able to complete 415 m during his 6 minute walk test. his O2 saturation at baseline was 96% and at the end of the test, the O2 saturation was 95%. he reported 0.5 level of dyspnea on Jan scale.     01/25/2019 Patient was able to complete 475 m during his 6 minute walk test. his O2 saturation at baseline was 95% and at the end of the test, the O2 saturation was 96%. he reported 1 level of dyspnea on Jan scale.     Patient is feeling better these days. No exertional dyspnea. No symptoms at rest or with daily living activities.    Past Medical History:   Diagnosis Date   • Hep C w/o coma, chronic (HCC)    • HTN (hypertension) 9/7/2010     Past Surgical History:   Procedure Laterality Date   • HERNIA REPAIR     • TONSILLECTOMY       Family History   Problem Relation Age of Onset   • Stroke Mother    • Psychiatric Illness Father    • Hypertension Father      Social History     Socioeconomic History   • Marital status:      Spouse name: Not on file   • Number of children: Not on file   • Years of education: Not on file  "  • Highest education level: Not on file   Occupational History   • Not on file   Social Needs   • Financial resource strain: Not on file   • Food insecurity     Worry: Not on file     Inability: Not on file   • Transportation needs     Medical: Not on file     Non-medical: Not on file   Tobacco Use   • Smoking status: Current Every Day Smoker     Packs/day: 1.50     Years: 24.00     Pack years: 36.00     Types: Cigarettes     Last attempt to quit: 2018     Years since quittin.6   • Smokeless tobacco: Never Used   Substance and Sexual Activity   • Alcohol use: Not Currently     Comment: \"very rarely\"   • Drug use: Not Currently     Types: Methamphetamines   • Sexual activity: Yes     Partners: Female   Lifestyle   • Physical activity     Days per week: Not on file     Minutes per session: Not on file   • Stress: Not on file   Relationships   • Social connections     Talks on phone: Not on file     Gets together: Not on file     Attends Hinduism service: Not on file     Active member of club or organization: Not on file     Attends meetings of clubs or organizations: Not on file     Relationship status: Not on file   • Intimate partner violence     Fear of current or ex partner: Not on file     Emotionally abused: Not on file     Physically abused: Not on file     Forced sexual activity: Not on file   Other Topics Concern   • Not on file   Social History Narrative   • Not on file     No Known Allergies  Outpatient Encounter Medications as of 2020   Medication Sig Dispense Refill   • lisinopril (PRINIVIL) 40 MG tablet Take 1 Tab by mouth every day. 90 Tab 0   • carvedilol (COREG) 25 MG Tab Take 1 Tab by mouth 2 times a day, with meals. 180 Tab 0   • atorvastatin (LIPITOR) 20 MG Tab Take 1 Tab by mouth every day. 90 Tab 0   • spironolactone (ALDACTONE) 25 MG Tab Take 1 Tab by mouth every day. 90 Tab 0   • cyclobenzaprine (FLEXERIL) 5 MG tablet Take 1-2 Tabs by mouth 3 times a day as needed. 30 Tab 0   • " "ibuprofen (MOTRIN) 600 MG Tab Take 1 Tab by mouth every 6 hours as needed. 30 Tab 0   • multivitamin (THERAGRAN) Tab Take 1 Tab by mouth every day.     • aspirin EC 81 MG EC tablet Take 1 Tab by mouth every day. 30 Tab 0     No facility-administered encounter medications on file as of 8/4/2020.      Review of Systems   Constitutional: Negative for chills, fever, malaise/fatigue and weight loss.   HENT: Negative for ear discharge, ear pain, hearing loss and nosebleeds.    Eyes: Negative for blurred vision, double vision, pain and discharge.   Respiratory: Negative for cough and shortness of breath.    Cardiovascular: Negative for chest pain, palpitations, orthopnea, claudication, leg swelling and PND.   Gastrointestinal: Negative for abdominal pain, blood in stool, melena, nausea and vomiting.   Genitourinary: Negative for dysuria and hematuria.   Musculoskeletal: Negative for falls, joint pain and myalgias.   Skin: Negative for itching and rash.   Neurological: Negative for dizziness, sensory change, speech change, loss of consciousness and headaches.   Endo/Heme/Allergies: Negative for environmental allergies. Does not bruise/bleed easily.   Psychiatric/Behavioral: Negative for depression, hallucinations and suicidal ideas.        Objective:   /72 (BP Location: Right arm, Patient Position: Sitting, BP Cuff Size: Adult)   Pulse 61   Resp 16   Ht 1.753 m (5' 9\")   Wt 92.4 kg (203 lb 12.8 oz)   SpO2 96%   BMI 30.10 kg/m²     Physical Exam   Constitutional: He is oriented to person, place, and time. No distress.   HENT:   Head: Normocephalic and atraumatic.   Right Ear: External ear normal.   Left Ear: External ear normal.   Eyes: Right eye exhibits no discharge. Left eye exhibits no discharge.   Neck: No JVD present. No thyromegaly present.   Cardiovascular: Normal rate, regular rhythm, normal heart sounds and intact distal pulses. Exam reveals no gallop and no friction rub.   No murmur " heard.  Pulmonary/Chest: Breath sounds normal. No respiratory distress.   Abdominal: Bowel sounds are normal. He exhibits no distension. There is no abdominal tenderness.   Musculoskeletal:         General: No tenderness or edema.   Neurological: He is alert and oriented to person, place, and time. No cranial nerve deficit.   Skin: Skin is warm and dry. He is not diaphoretic.   Psychiatric: He has a normal mood and affect. His behavior is normal.   Nursing note and vitals reviewed.      Assessment:     1. ACC/AHA stage C systolic heart failure (HCC) LVEF improved to 50%     2. Heart failure, NYHA class 1 (HCC)     3. Non-ischemic cardiomyopathy (HCC)     4. Mixed hyperlipidemia     5. HTN (hypertension), malignant     6. High risk medication use         Medical Decision Making:  Today's Assessment / Status / Plan:   Non- Ischemic Cardiomyopathy:  His LVEF has improved to 50% with medical therapy and cessation from illicit drugs use.  Chronically illed condition which requires ongoing close monitoring and treatment to improve survival rate along with decreasing risk of clinical decompensation and hospitalization.    Today, based on physical examination findings, patient is euvolemic. No JVD, lungs are clear to auscultation, no pitting edema in bilateral lower extremities, no ascites.     Dry weight is 203 lbs.     Continue Lisinopril 40 mg po daily, coreg 25 mg po bid.      Aldactone antagonist with Spironolactone 25 mg daily.    No indication for ICD placement as LVEF has improved to 50%.    Hypertension:  Blood pressure is borderline. He is asymptomatic. Might be dry. Will stop furosemide.    Hyperlipidemia:  Optimize statin with Atorvastatin 20 mg qhs.    I will see patient back in clinic with lab tests and studies results in 6 months.    I thank you for referring patient to our Cardiology Clinic today.

## 2021-03-15 DIAGNOSIS — Z23 NEED FOR VACCINATION: ICD-10-CM

## 2021-06-09 ENCOUNTER — HOSPITAL ENCOUNTER (OUTPATIENT)
Dept: LAB | Facility: MEDICAL CENTER | Age: 66
End: 2021-06-09
Attending: INTERNAL MEDICINE
Payer: OTHER MISCELLANEOUS

## 2021-06-09 DIAGNOSIS — E78.2 MIXED HYPERLIPIDEMIA: ICD-10-CM

## 2021-06-09 LAB
ALBUMIN SERPL BCP-MCNC: 3.6 G/DL (ref 3.2–4.9)
ALBUMIN/GLOB SERPL: 0.9 G/DL
ALP SERPL-CCNC: 71 U/L (ref 30–99)
ALT SERPL-CCNC: 102 U/L (ref 2–50)
ANION GAP SERPL CALC-SCNC: 6 MMOL/L (ref 7–16)
AST SERPL-CCNC: 95 U/L (ref 12–45)
BILIRUB SERPL-MCNC: 1.1 MG/DL (ref 0.1–1.5)
BUN SERPL-MCNC: 18 MG/DL (ref 8–22)
CALCIUM SERPL-MCNC: 8.7 MG/DL (ref 8.5–10.5)
CHLORIDE SERPL-SCNC: 101 MMOL/L (ref 96–112)
CHOLEST SERPL-MCNC: 105 MG/DL (ref 100–199)
CO2 SERPL-SCNC: 27 MMOL/L (ref 20–33)
CREAT SERPL-MCNC: 0.93 MG/DL (ref 0.5–1.4)
FASTING STATUS PATIENT QL REPORTED: NORMAL
GLOBULIN SER CALC-MCNC: 4.2 G/DL (ref 1.9–3.5)
GLUCOSE SERPL-MCNC: 139 MG/DL (ref 65–99)
HDLC SERPL-MCNC: 29 MG/DL
LDLC SERPL CALC-MCNC: 42 MG/DL
POTASSIUM SERPL-SCNC: 4.9 MMOL/L (ref 3.6–5.5)
PROT SERPL-MCNC: 7.8 G/DL (ref 6–8.2)
SODIUM SERPL-SCNC: 134 MMOL/L (ref 135–145)
TRIGL SERPL-MCNC: 172 MG/DL (ref 0–149)

## 2021-06-09 PROCEDURE — 80053 COMPREHEN METABOLIC PANEL: CPT

## 2021-06-09 PROCEDURE — 80061 LIPID PANEL: CPT

## 2021-06-09 PROCEDURE — 36415 COLL VENOUS BLD VENIPUNCTURE: CPT

## 2021-06-11 ENCOUNTER — OFFICE VISIT (OUTPATIENT)
Dept: CARDIOLOGY | Facility: MEDICAL CENTER | Age: 66
End: 2021-06-11
Payer: OTHER MISCELLANEOUS

## 2021-06-11 VITALS
OXYGEN SATURATION: 97 % | DIASTOLIC BLOOD PRESSURE: 80 MMHG | RESPIRATION RATE: 15 BRPM | HEIGHT: 69 IN | BODY MASS INDEX: 30.21 KG/M2 | WEIGHT: 204 LBS | HEART RATE: 63 BPM | SYSTOLIC BLOOD PRESSURE: 130 MMHG

## 2021-06-11 DIAGNOSIS — Z79.899 HIGH RISK MEDICATION USE: ICD-10-CM

## 2021-06-11 DIAGNOSIS — I42.8 NON-ISCHEMIC CARDIOMYOPATHY (HCC): ICD-10-CM

## 2021-06-11 DIAGNOSIS — I10 HTN (HYPERTENSION), MALIGNANT: ICD-10-CM

## 2021-06-11 DIAGNOSIS — I50.20 ACC/AHA STAGE C SYSTOLIC HEART FAILURE (HCC): ICD-10-CM

## 2021-06-11 DIAGNOSIS — E78.2 MIXED HYPERLIPIDEMIA: ICD-10-CM

## 2021-06-11 DIAGNOSIS — I10 ESSENTIAL HYPERTENSION: ICD-10-CM

## 2021-06-11 DIAGNOSIS — B18.2 CHRONIC HEPATITIS C WITHOUT HEPATIC COMA (HCC): ICD-10-CM

## 2021-06-11 DIAGNOSIS — I42.9 CARDIOMYOPATHY, UNSPECIFIED TYPE (HCC): ICD-10-CM

## 2021-06-11 DIAGNOSIS — R79.89 ELEVATED LFTS: ICD-10-CM

## 2021-06-11 DIAGNOSIS — I50.9 HEART FAILURE, NYHA CLASS 1 (HCC): ICD-10-CM

## 2021-06-11 PROCEDURE — 99214 OFFICE O/P EST MOD 30 MIN: CPT | Performed by: INTERNAL MEDICINE

## 2021-06-11 RX ORDER — CARVEDILOL 25 MG/1
25 TABLET ORAL 2 TIMES DAILY WITH MEALS
Qty: 180 TABLET | Refills: 4 | Status: SHIPPED | OUTPATIENT
Start: 2021-06-11 | End: 2021-11-01 | Stop reason: SDUPTHER

## 2021-06-11 RX ORDER — SPIRONOLACTONE 25 MG/1
25 TABLET ORAL DAILY
Qty: 90 TABLET | Refills: 4 | Status: SHIPPED | OUTPATIENT
Start: 2021-06-11 | End: 2021-11-01 | Stop reason: SDUPTHER

## 2021-06-11 RX ORDER — LISINOPRIL 40 MG/1
40 TABLET ORAL DAILY
Qty: 90 TABLET | Refills: 4 | Status: SHIPPED | OUTPATIENT
Start: 2021-06-11 | End: 2021-06-11 | Stop reason: SDUPTHER

## 2021-06-11 RX ORDER — LISINOPRIL 40 MG/1
40 TABLET ORAL DAILY
Qty: 90 TABLET | Refills: 4 | Status: SHIPPED | OUTPATIENT
Start: 2021-06-11 | End: 2021-11-01 | Stop reason: SDUPTHER

## 2021-06-11 RX ORDER — CARVEDILOL 25 MG/1
25 TABLET ORAL 2 TIMES DAILY WITH MEALS
Qty: 180 TABLET | Refills: 4 | Status: SHIPPED | OUTPATIENT
Start: 2021-06-11 | End: 2021-06-11 | Stop reason: SDUPTHER

## 2021-06-11 RX ORDER — ATORVASTATIN CALCIUM 20 MG/1
20 TABLET, FILM COATED ORAL DAILY
Qty: 90 TABLET | Refills: 4 | Status: SHIPPED
Start: 2021-06-11 | End: 2021-06-11

## 2021-06-11 RX ORDER — SPIRONOLACTONE 25 MG/1
25 TABLET ORAL DAILY
Qty: 90 TABLET | Refills: 4 | Status: SHIPPED | OUTPATIENT
Start: 2021-06-11 | End: 2021-06-11 | Stop reason: SDUPTHER

## 2021-06-11 ASSESSMENT — MINNESOTA LIVING WITH HEART FAILURE QUESTIONNAIRE (MLHF)
DIFFICULTY WORKING TO EARN A LIVING: 0
DIFFICULTY SOCIALIZING WITH FAMILY OR FRIENDS: 0
LOSS OF SELF CONTROL IN YOUR LIFE: 0
MAKING YOU SHORT OF BREATH: 0
TIRED, FATIGUED OR LOW ON ENERGY: 1
DIFFICULTY WITH RECREATIONAL PASTIMES, SPORTS, HOBBIES: 0
MAKING YOU STAY IN A HOSPITAL: 0
TOTAL_SCORE: 6
COSTING YOU MONEY FOR MEDICAL CARE: 1
EATING LESS FOODS YOU LIKE: 0
DIFFICULTY WITH SEXUAL ACTIVITIES: 1
GIVING YOU SIDE EFFECTS FROM TREATMENTS: 0
MAKING YOU WORRY: 0
DIFFICULTY GOING AWAY FROM HOME: 0
MAKING YOU FEEL DEPRESSED: 1
DIFFICULTY SLEEPING WELL AT NIGHT: 0
HAVING TO SIT OR LIE DOWN DURING THE DAY: 0
WALKING ABOUT OR CLIMBING STAIRS DIFFICULT: 1
WORKING AROUND THE HOUSE OR YARD DIFFICULT: 0
SWELLING IN ANKLES OR LEGS: 0
DIFFICULTY TO CONCENTRATE OR REMEMBERING THINGS: 0
FEELING LIKE A BURDEN TO FAMILY AND FRIENDS: 1

## 2021-06-11 ASSESSMENT — ENCOUNTER SYMPTOMS
SENSORY CHANGE: 0
BLOOD IN STOOL: 0
HEADACHES: 0
CLAUDICATION: 0
MYALGIAS: 0
COUGH: 0
EYE DISCHARGE: 0
EYE PAIN: 0
LOSS OF CONSCIOUSNESS: 0
NAUSEA: 0
FALLS: 0
BRUISES/BLEEDS EASILY: 0
PALPITATIONS: 0
ABDOMINAL PAIN: 0
PND: 0
SPEECH CHANGE: 0
DOUBLE VISION: 0
SHORTNESS OF BREATH: 0
HALLUCINATIONS: 0
DEPRESSION: 0
VOMITING: 0
BLURRED VISION: 0
FEVER: 0
ORTHOPNEA: 0
CHILLS: 0
DIZZINESS: 0
WEIGHT LOSS: 0

## 2021-06-11 NOTE — PATIENT INSTRUCTIONS
Elevated LFTs might be related to Hep C. Will refer to hepatologist. In the meantime, will advise to stop Atorvastatin for now.

## 2021-06-11 NOTE — PROGRESS NOTES
Chief Complaint   Patient presents with   • Congestive Heart Failure       Subjective:   Doug Arora is a 65 y.o. male who presents today for cardiac care for nonischemic cardiomyopathy with left ventricular systolic function documented at 20%. He did have a history of alcohol abuse along with methamphetamine abuse. He was in the hospital in August 2016 because of heart failure exacerbation. Since then, patient has stopped using methamphetamine and alcohol. He feels extremely well these days. He is currently taken carvedilol 25 mg by mouth twice a day, lisinopril 40 by mouth once a day and furosemide 20 mg by mouth bid. His LVEF has improved to 50% with medical therapy and cessation from illicit drugs use. LVEF of 50 % with global hypokinesis. (05/2019) No significant valvular disease. I have independently interpreted and reviewed echocardiogram's actual images.      At prior visit, patient was able to complete 415 m during his 6 minute walk test. his O2 saturation at baseline was 96% and at the end of the test, the O2 saturation was 95%. he reported 0.5 level of dyspnea on Jan scale.     01/25/2019 Patient was able to complete 475 m during his 6 minute walk test. his O2 saturation at baseline was 95% and at the end of the test, the O2 saturation was 96%. he reported 1 level of dyspnea on Jan scale.     Patient is feeling better these days. No exertional dyspnea. No symptoms at rest or with daily living activities.    Past Medical History:   Diagnosis Date   • Hep C w/o coma, chronic (HCC)    • HTN (hypertension) 9/7/2010     Past Surgical History:   Procedure Laterality Date   • HERNIA REPAIR     • TONSILLECTOMY       Family History   Problem Relation Age of Onset   • Stroke Mother    • Psychiatric Illness Father    • Hypertension Father      Social History     Socioeconomic History   • Marital status:      Spouse name: Not on file   • Number of children: Not on file   • Years of education: Not on file  "  • Highest education level: Not on file   Occupational History   • Not on file   Tobacco Use   • Smoking status: Current Every Day Smoker     Packs/day: 1.50     Years: 24.00     Pack years: 36.00     Types: Cigarettes     Last attempt to quit: 2018     Years since quittin.5   • Smokeless tobacco: Never Used   Vaping Use   • Vaping Use: Never used   Substance and Sexual Activity   • Alcohol use: Not Currently     Comment: \"very rarely\"   • Drug use: Not Currently     Types: Methamphetamines   • Sexual activity: Yes     Partners: Female   Other Topics Concern   • Not on file   Social History Narrative   • Not on file     Social Determinants of Health     Financial Resource Strain:    • Difficulty of Paying Living Expenses:    Food Insecurity:    • Worried About Running Out of Food in the Last Year:    • Ran Out of Food in the Last Year:    Transportation Needs:    • Lack of Transportation (Medical):    • Lack of Transportation (Non-Medical):    Physical Activity:    • Days of Exercise per Week:    • Minutes of Exercise per Session:    Stress:    • Feeling of Stress :    Social Connections:    • Frequency of Communication with Friends and Family:    • Frequency of Social Gatherings with Friends and Family:    • Attends Confucianist Services:    • Active Member of Clubs or Organizations:    • Attends Club or Organization Meetings:    • Marital Status:    Intimate Partner Violence:    • Fear of Current or Ex-Partner:    • Emotionally Abused:    • Physically Abused:    • Sexually Abused:      No Known Allergies  Outpatient Encounter Medications as of 2021   Medication Sig Dispense Refill   • spironolactone (ALDACTONE) 25 MG Tab Take 1 tablet by mouth every day. 90 tablet 4   • lisinopril (PRINIVIL) 40 MG tablet Take 1 tablet by mouth every day. 90 tablet 4   • carvedilol (COREG) 25 MG Tab Take 1 tablet by mouth 2 times a day with meals. 180 tablet 4   • [DISCONTINUED] spironolactone (ALDACTONE) 25 MG Tab Take " 1 tablet by mouth every day. 90 tablet 4   • [DISCONTINUED] lisinopril (PRINIVIL) 40 MG tablet Take 1 tablet by mouth every day. 90 tablet 4   • [DISCONTINUED] carvedilol (COREG) 25 MG Tab Take 1 tablet by mouth 2 times a day with meals. 180 tablet 4   • [DISCONTINUED] atorvastatin (LIPITOR) 20 MG Tab Take 1 tablet by mouth every day. 90 tablet 4   • [DISCONTINUED] spironolactone (ALDACTONE) 25 MG Tab Take 1 Tab by mouth every day. 90 Tab 4   • [DISCONTINUED] lisinopril (PRINIVIL) 40 MG tablet Take 1 Tab by mouth every day. 90 Tab 4   • [DISCONTINUED] carvedilol (COREG) 25 MG Tab Take 1 Tab by mouth 2 times a day, with meals. 180 Tab 4   • [DISCONTINUED] atorvastatin (LIPITOR) 20 MG Tab Take 1 Tab by mouth every day. 90 Tab 4   • [DISCONTINUED] cyclobenzaprine (FLEXERIL) 5 MG tablet Take 1-2 Tabs by mouth 3 times a day as needed. 30 Tab 0   • [DISCONTINUED] ibuprofen (MOTRIN) 600 MG Tab Take 1 Tab by mouth every 6 hours as needed. 30 Tab 0   • [DISCONTINUED] multivitamin (THERAGRAN) Tab Take 1 Tab by mouth every day.     • [DISCONTINUED] aspirin EC 81 MG EC tablet Take 1 Tab by mouth every day. 30 Tab 0     No facility-administered encounter medications on file as of 6/11/2021.     Review of Systems   Constitutional: Negative for chills, fever, malaise/fatigue and weight loss.   HENT: Negative for ear discharge, ear pain, hearing loss and nosebleeds.    Eyes: Negative for blurred vision, double vision, pain and discharge.   Respiratory: Negative for cough and shortness of breath.    Cardiovascular: Negative for chest pain, palpitations, orthopnea, claudication, leg swelling and PND.   Gastrointestinal: Negative for abdominal pain, blood in stool, melena, nausea and vomiting.   Genitourinary: Negative for dysuria and hematuria.   Musculoskeletal: Negative for falls, joint pain and myalgias.   Skin: Negative for itching and rash.   Neurological: Negative for dizziness, sensory change, speech change, loss of  "consciousness and headaches.   Endo/Heme/Allergies: Negative for environmental allergies. Does not bruise/bleed easily.   Psychiatric/Behavioral: Negative for depression, hallucinations and suicidal ideas.        Objective:   /80 (BP Location: Right arm, Patient Position: Sitting, BP Cuff Size: Adult)   Pulse 63   Resp 15   Ht 1.753 m (5' 9\")   Wt 92.5 kg (204 lb)   SpO2 97%   BMI 30.13 kg/m²     Physical Exam   Constitutional: He is oriented to person, place, and time. No distress.   HENT:   Head: Normocephalic and atraumatic.   Right Ear: External ear normal.   Left Ear: External ear normal.   Eyes: Right eye exhibits no discharge. Left eye exhibits no discharge.   Neck: No JVD present. No thyromegaly present.   Cardiovascular: Normal rate, regular rhythm and normal heart sounds. Exam reveals no gallop and no friction rub.   No murmur heard.  Pulmonary/Chest: Breath sounds normal. No respiratory distress.   Abdominal: Bowel sounds are normal. He exhibits no distension. There is no abdominal tenderness.   Musculoskeletal:         General: No tenderness.   Neurological: He is alert and oriented to person, place, and time. No cranial nerve deficit.   Skin: Skin is warm and dry. He is not diaphoretic.   Psychiatric: His behavior is normal.   Nursing note and vitals reviewed.      Assessment:     1. ACC/AHA stage C systolic heart failure (HCC) LVEF improved to 50%  spironolactone (ALDACTONE) 25 MG Tab    lisinopril (PRINIVIL) 40 MG tablet    carvedilol (COREG) 25 MG Tab    DISCONTINUED: spironolactone (ALDACTONE) 25 MG Tab    DISCONTINUED: lisinopril (PRINIVIL) 40 MG tablet    DISCONTINUED: carvedilol (COREG) 25 MG Tab   2. Heart failure, NYHA class 1 (HCC)     3. Non-ischemic cardiomyopathy (HCC)  spironolactone (ALDACTONE) 25 MG Tab    lisinopril (PRINIVIL) 40 MG tablet    carvedilol (COREG) 25 MG Tab    DISCONTINUED: spironolactone (ALDACTONE) 25 MG Tab    DISCONTINUED: lisinopril (PRINIVIL) 40 MG tablet "    DISCONTINUED: carvedilol (COREG) 25 MG Tab   4. Mixed hyperlipidemia  DISCONTINUED: atorvastatin (LIPITOR) 20 MG Tab   5. HTN (hypertension), malignant     6. High risk medication use     7. Elevated LFTs  REFERRAL TO GASTROENTEROLOGY   8. Chronic hepatitis C without hepatic coma (HCC)         Medical Decision Making:  Today's Assessment / Status / Plan:   Non- Ischemic Cardiomyopathy:  His LVEF has improved to 50% with medical therapy and cessation from illicit drugs use.  Chronically illed condition which requires ongoing close monitoring and treatment to improve survival rate along with decreasing risk of clinical decompensation and hospitalization.    Today, based on physical examination findings, patient is euvolemic. No JVD, lungs are clear to auscultation, no pitting edema in bilateral lower extremities, no ascites.     Dry weight is 204 lbs.     Continue Lisinopril 40 mg po daily, coreg 25 mg po bid.      Aldactone antagonist with Spironolactone 25 mg daily.    No indication for ICD placement as LVEF has improved to 50%.    Hypertension:  Blood pressure is well controlled.    Hyperlipidemia:    Elevated LFTs might be related to Hep C. Will refer to hepatologist. In the meantime, will advise to stop Atorvastatin for now.    I will see patient back in clinic with lab tests and studies results in 6 months.    I thank you for referring patient to our Cardiology Clinic today.

## 2021-11-01 DIAGNOSIS — I50.20 ACC/AHA STAGE C SYSTOLIC HEART FAILURE (HCC): ICD-10-CM

## 2021-11-01 DIAGNOSIS — I42.8 NON-ISCHEMIC CARDIOMYOPATHY (HCC): ICD-10-CM

## 2021-11-03 RX ORDER — SPIRONOLACTONE 25 MG/1
25 TABLET ORAL DAILY
Qty: 90 TABLET | Refills: 2 | Status: SHIPPED | OUTPATIENT
Start: 2021-11-03

## 2021-11-03 RX ORDER — CARVEDILOL 25 MG/1
25 TABLET ORAL 2 TIMES DAILY WITH MEALS
Qty: 180 TABLET | Refills: 2 | Status: SHIPPED | OUTPATIENT
Start: 2021-11-03

## 2021-11-03 RX ORDER — LISINOPRIL 40 MG/1
40 TABLET ORAL DAILY
Qty: 90 TABLET | Refills: 2 | Status: SHIPPED | OUTPATIENT
Start: 2021-11-03

## 2022-01-06 ENCOUNTER — PATIENT MESSAGE (OUTPATIENT)
Dept: HEALTH INFORMATION MANAGEMENT | Facility: OTHER | Age: 67
End: 2022-01-06
Payer: COMMERCIAL